# Patient Record
Sex: MALE | Race: WHITE | Employment: FULL TIME | URBAN - METROPOLITAN AREA
[De-identification: names, ages, dates, MRNs, and addresses within clinical notes are randomized per-mention and may not be internally consistent; named-entity substitution may affect disease eponyms.]

---

## 2017-04-04 RX ORDER — ISOSORBIDE MONONITRATE 30 MG/1
TABLET, EXTENDED RELEASE ORAL
Qty: 90 TAB | Refills: 2 | Status: SHIPPED | OUTPATIENT
Start: 2017-04-04 | End: 2017-12-18 | Stop reason: SDUPTHER

## 2017-04-14 ENCOUNTER — HOSPITAL ENCOUNTER (EMERGENCY)
Age: 77
Discharge: HOME | End: 2017-04-14
Attending: EMERGENCY MEDICINE | Admitting: EMERGENCY MEDICINE
Payer: COMMERCIAL

## 2017-04-14 ENCOUNTER — APPOINTMENT (OUTPATIENT)
Dept: CT IMAGING | Age: 77
End: 2017-04-14
Attending: EMERGENCY MEDICINE
Payer: COMMERCIAL

## 2017-04-14 VITALS
RESPIRATION RATE: 20 BRPM | DIASTOLIC BLOOD PRESSURE: 85 MMHG | OXYGEN SATURATION: 99 % | WEIGHT: 180 LBS | TEMPERATURE: 97.9 F | BODY MASS INDEX: 28.93 KG/M2 | SYSTOLIC BLOOD PRESSURE: 131 MMHG | HEART RATE: 66 BPM | HEIGHT: 66 IN

## 2017-04-14 DIAGNOSIS — S00.01XA SCALP ABRASION, INITIAL ENCOUNTER: ICD-10-CM
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
## 2017-04-14 PROCEDURE — 99284 EMERGENCY DEPT VISIT MOD MDM: CPT

## 2017-04-14 PROCEDURE — 77030036549 HC CLLR CERV EMT SEMI-RID PREFB S2SG -B

## 2017-04-14 PROCEDURE — 70450 CT HEAD/BRAIN W/O DYE: CPT

## 2017-04-14 PROCEDURE — 72125 CT NECK SPINE W/O DYE: CPT
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
## 2017-06-06 ENCOUNTER — OFFICE VISIT (OUTPATIENT)
Dept: CARDIOLOGY CLINIC | Age: 77
End: 2017-06-06

## 2017-06-06 VITALS
WEIGHT: 192 LBS | HEART RATE: 65 BPM | HEIGHT: 66 IN | SYSTOLIC BLOOD PRESSURE: 114 MMHG | BODY MASS INDEX: 30.86 KG/M2 | OXYGEN SATURATION: 97 % | DIASTOLIC BLOOD PRESSURE: 60 MMHG

## 2017-06-06 DIAGNOSIS — I15.9 SECONDARY HYPERTENSION: Primary | ICD-10-CM

## 2017-06-06 NOTE — PROGRESS NOTES
[facility]    Mr. Cristy Lang is a 78 y.o. male with history of coronary artery disease s/p LAD stent (06/2014), sleep apnea, hypertension, hyperlipidemia and obesity. Mr. Cristy Lang is here today for follow up appointment. He denies any hospital admission or ER visit since last six months. He denies any use of sublingual nitroglycerin. He denies any palpitations, presyncope or syncope. He denies any chest pain or chest tightness. He claims to take his medications regularly. He goes to chiropractor to help with his joint pains. Denies any nausea, vomiting, abdominal pain, fever, chills, sputum production. No hematuria or other bleeding complaints    Past Medical History:   Diagnosis Date    Asbestosis ([address]     CAD (coronary artery disease)     (06/2014) Mid LAD (3.25 X 23 mm ) Xience ROSA M    CPAP (continuous positive airway pressure) dependence     Hemorrhoids     Hypercholesteremia     Hypertension     Sleep apnea     Vitamin D deficiency        Past Surgical History:   Procedure Laterality Date    CHEST SURGERY PROCEDURE UNLISTED  1971    lobectomy left lung    HX APPENDECTOMY         Current Outpatient Prescriptions   Medication Sig    isosorbide mononitrate ER (IMDUR) 30 mg tablet TAKE 1 TABLET DAILY    carvedilol (COREG) 3.125 mg tablet TAKE 1 TABLET TWICE A DAY WITH MEALS    atorvastatin (LIPITOR) 20 mg tablet Take 1 Tab by mouth daily.  traMADol (ULTRAM) 50 mg tablet     lisinopril (PRINIVIL, ZESTRIL) 5 mg tablet Take 0.5 Tabs by mouth daily.  glimepiride (AMARYL) 2 mg tablet Take 4 mg by mouth every morning.  glucosamine-chondroitin (ARTHX) 500-400 mg cap Take 0.5 Caps by mouth daily.  nitroglycerin (NITROSTAT) 0.4 mg SL tablet 1 Tab by SubLINGual route as needed for Chest Pain.  Calcium Carbonate-Vit D3-Min (CALTRATE 600+D PLUS MINERALS) 600 mg calcium- 400 unit Tab Take  by mouth.       aspirin 81 mg tablet Take 162 mg by mouth daily.  ergocalciferol (ERGOCALCIFEROL) 50,000 unit capsule Take 50,000 Units by mouth every thirty (30) days.  omega-3 fatty acids-vitamin e (FISH OIL) 1,000 mg cap Take 1 Cap by mouth daily.  multivitamin (ONE A DAY) tablet Take 1 Tab by mouth daily. No current facility-administered medications for this visit. Allergies and Sensitivities:  No Known Allergies    Family History:  No family history on file. Social History:  Social History   Substance Use Topics    Smoking status: Former Smoker    Smokeless tobacco: Not on file    Alcohol use 1.0 oz/week     2 Glasses of wine per week      Comment: 16 oz daily     He  reports that he has quit smoking. He does not have any smokeless tobacco history on file. He  reports that he drinks about 1.0 oz of alcohol per week     Review of Systems:  Cardiac symptoms as noted above in HPI. All others negative. Denies fatigue, malaise, skin rash, joint pain, blurring vision, photophobia, neck pain, hemoptysis, chronic cough, nausea, vomiting, burning micturition, BRBPR, chronic headaches. Physical Exam:  BP Readings from Last 3 Encounters:   04/14/17 131/85   09/13/16 109/70   09/06/16 109/70         Pulse Readings from Last 3 Encounters:   04/14/17 66   09/13/16 66   09/06/16 71          Wt Readings from Last 3 Encounters:   04/14/17 180 lb (81.6 kg)   09/13/16 184 lb (83.5 kg)   09/06/16 184 lb (83.5 kg)       Constitutional: Oriented to person, place, and time. HENT: Head: Normocephalic and atraumatic. Neck: No JVD present. Cardiovascular: Regular rhythm. No murmur, gallop or rubs appreciated  Lung: Breath sounds normal. No respiratory distress. No ronchi or rales appreciated  Abdominal: No tenderness. No rebound and no guarding. Musculoskeletal: There is no lower extremity edema.      Review of Data  LABS:   Lab Results   Component Value Date/Time    Sodium 136 09/07/2014 11:15 AM    Potassium 4.0 09/07/2014 11:15 AM    Chloride 103 09/07/2014 11:15 AM    CO2 29 09/07/2014 11:15 AM    Glucose 218 09/07/2014 11:15 AM    BUN 12 09/07/2014 11:15 AM    Creatinine 1.01 09/07/2014 11:15 AM     Lipids Latest Ref Rng & Units 5/31/2014   Chol, Total <200 MG/   HDL 40 - 60 MG/DL 32(L)   LDL 0 - 100 MG/DL 40.2   Trig <150 MG/(H)   Chol/HDL Ratio 0 - 5.0   3.8     Lab Results   Component Value Date/Time    ALT (SGPT) 55 05/31/2014 02:31 AM     No results found for: HBA1C, [id number]    EKG  (07/2014) Sinus rhythm at 73 bpm. No ST-t changes of ischemia. Normal NM and QRS. Normal axis    ECHO (09/16)  SUMMARY:  Left ventricle: Systolic function was at the lower limits of normal.  Ejection fraction was estimated to be 55 %. There were no regional wall  motion abnormalities. Doppler parameters were consistent with abnormal  left ventricular relaxation (grade 1 diastolic dysfunction). Right ventricle: Systolic function was normal.  Mitral valve: There was no evidence for stenosis. There was trivial  regurgitation. Aortic valve: There was no stenosis. There was trivial regurgitation. Tricuspid valve: There was trivial regurgitation. STRESS TEST (06/2014)  1. Pharmacologic nuclear stress test using Lexiscan. 2. No EKG evidence of ischemia during Lexiscan infusion. 3. Medium to large area of reversible defect which is moderate in intensity and involving mid to distal anterior and anteroseptal wall involving apex, suggesting ischemia. No convincing evidence of infarct. 4. Gated images showed calculated ejection fraction of 69% without any obvious wall motion abnormality.     CATHETERIZATION (06/2014)  LVGram:(manual injection)   EF: 55% without significant wall motion abnormalities   Mitral Regurgitation: No significant   No significant gradient across the aortic valve   LVEDP ~12-14 mm hg   Coronary angiography:   Dominance: Right   LM: Angiographically normal   LAD: Mid tubular 95% stenosis otherwise normal, Large D1: prox 20% luminal irregularities otherwise normal   LCX: /OM: Angiographically normal   RCA: Dominant, 10-20% prox luminal irregularities otherwise Angiographically normal   PCI Detail for Mid LAD stenosis   Pre-dilation 2.5 X 15 mm TREK , 3.25 X 23 mm Xience ROSA M , Post-dilation 3.5 X 15 mm TREK NC baloon @ 14-16 génesis   Stenosis from 95% --> 0 %     STRESS TEST(09/16)  SUMMARY/IMPRESSION  1. Pharmacologic nuclear stress test using Lexiscan. 2. No EKG evidence of ischemia during Lexiscan infusion. 3. No convincing evidence of reversible defect to suggest ongoing major ischemia. 4. Diaphragmatic attenuation artifact noted over inferior border of the heart. Very small fixed apical defect, which is most likely from apical thinning versus artifact. 5. Calculated ejection fraction of 71% without any obvious wall motion abnormality. End-diastolic volume of 66 mL.     IMPRESSION & PLAN:  Brina Carney is a 78 y.o. male with coronary artery disease, hypertension, hyperlipidemia and obesity and sleep apnea. Coronary artery disease:  He had an LAD stent in 2014. Nuclear stress and echo in 09/16 without any RWMA and ischemia. Continue BB, imdur, ASA and statin  No S/L NTG since last visit. No angina currently. Continue same. Hypertension:  Bp is 114/60 mm Hg today. Continue same    Hyperlipidemia:  He is on low intensity atorvastatin without any side effect. PCP checks lipid profile and manages this. I don't have last profile on record. Per patient, it was within acceptable range at PCP. Obesity and sleep apnea: Importance of diet and exercise was discussed with the patient again. He admits to binge eating sometimes. Advised to follow cardiac diet again this visit. This plan was discussed with patient who is in agreement. Thank you for allowing me to participate in patient care. Please feel free to call me if you have any question or concern.

## 2017-06-06 NOTE — PROGRESS NOTES
1. Have you been to the ER, urgent care clinic since your last visit? Hospitalized since your last visit? No    2. Have you seen or consulted any other health care providers outside of the [address] [place] [address] since your last visit? Include any pap smears or colon screening.  No

## 2017-06-06 NOTE — MR AVS SNAPSHOT
Visit Information Date & Time Provider Department Dept. Phone Encounter #  
 6/6/2017  1:30 PM Nicholas Tanner MD [address] [place] [address] Specialist at [facility] [phone] [id number] Follow-up Instructions Return in about 9 months (around 3/6/2018). Upcoming Health Maintenance Date Due DTaP/Tdap/Td series (1 - Tdap) 10/17/1963 FOBT Q 1 YEAR AGE 50-75 10/17/1992 ZOSTER VACCINE AGE 60> 10/17/2002 GLAUCOMA SCREENING Q2Y 10/17/2007 Pneumococcal 65+ Low/Medium Risk (1 of 2 - PCV13) 10/17/2007 MEDICARE YEARLY EXAM 10/17/2007 INFLUENZA AGE 9 TO ADULT 8/1/2017 Allergies as of 6/6/2017  Review Complete On: 6/6/2017 By: Rody Atkins LPN No Known Allergies Current Immunizations  Never Reviewed No immunizations on file. Not reviewed this visit You Were Diagnosed With   
  
 Codes Comments Secondary hypertension    -  Primary ICD-10-CM: I15.9 ICD-9-CM: 405.99 Vitals BP Pulse Height(growth percentile) Weight(growth percentile) SpO2 BMI  
 114/60 65 5' 6\" (1.676 m) 192 lb (87.1 kg) 97% 30.99 kg/m2 Smoking Status Former Smoker BMI and BSA Data Body Mass Index Body Surface Area 30.99 kg/m 2 2.01 m 2 Preferred Pharmacy Pharmacy Name Phone [address] [facility] [phone] Your Updated Medication List  
  
   
This list is accurate as of: 6/6/17  1:42 PM.  Always use your most recent med list.  
  
  
  
  
 aspirin 81 mg tablet Take 162 mg by mouth daily. atorvastatin 20 mg tablet Commonly known as:  LIPITOR Take 1 Tab by mouth daily. carvedilol 3.125 mg tablet Commonly known as:  COREG  
TAKE 1 TABLET TWICE A DAY WITH MEALS  
  
 ergocalciferol 50,000 unit capsule Commonly known as:  ERGOCALCIFEROL Take 50,000 Units by mouth every thirty (30) days. FISH OIL 1,000 mg Cap Generic drug:  omega-3 fatty acids-vitamin e Take 1 Cap by mouth daily. glimepiride 2 mg tablet Commonly known as:  AMARYL Take 4 mg by mouth every morning. glucosamine-chondroitin 500-400 mg Cap Commonly known as:  [facility] Take 0.5 Caps by mouth daily. isosorbide mononitrate ER 30 mg tablet Commonly known as:  IMDUR  
TAKE 1 TABLET DAILY  
  
 lisinopril 5 mg tablet Commonly known as:  Latesha Gear Take 0.5 Tabs by mouth daily. multivitamin tablet Commonly known as:  ONE A DAY Take 1 Tab by mouth daily. nitroglycerin 0.4 mg SL tablet Commonly known as:  NITROSTAT  
1 Tab by SubLINGual route as needed for Chest Pain. We Performed the Following AMB POC EKG ROUTINE W/ 12 LEADS, INTER & REP [48981 CPT(R)] Follow-up Instructions Return in about 9 months (around 3/6/2018). Introducing [facility]! [facility] introduces [employer] patient portal. Now you can access parts of your medical record, email your doctor's office, and request medication refills online. 1. In your internet browser, go to [email or web address] 2. Click on the First Time User? Click Here link in the Sign In box. You will see the New Member Sign Up page. 3. Enter your [employer] Access Code exactly as it appears below. You will not need to use this code after youve completed the sign-up process. If you do not sign up before the expiration date, you must request a new code. · [employer] Access Code: [email or web address] Expires: 7/13/2017 10:18 AM 
 
4. Enter the last four digits of your Social Security Number (xxxx) and Date of Birth (mm/dd/yyyy) as indicated and click Submit. You will be taken to the next sign-up page. 5. Create a [employer] ID. This will be your [employer] login ID and cannot be changed, so think of one that is secure and easy to remember. 6. Create a [employer] password. You can change your password at any time. 7. Enter your Password Reset Question and Answer. This can be used at a later time if you forget your password. 8. Enter your e-mail address. You will receive e-mail notification when new information is available in [address] 9. Click Sign Up. You can now view and download portions of your medical record. 10. Click the Download Summary menu link to download a portable copy of your medical information. If you have questions, please visit the Frequently Asked Questions section of the [employer] website. Remember, [employer] is NOT to be used for urgent needs. For medical emergencies, dial 911. Now available from your iPhone and Android! Please provide this summary of care documentation to your next provider. Your primary care clinician is listed as Kamryn Pfeiffer. If you have any questions after today's visit, please call [phone].

## 2017-06-06 NOTE — LETTER
Patient:  Airam Levy YOB: 1940 Date of Visit: 6/6/2017 Dear Celine Thrasher MD 
[address] [facility] [address] VIA Facsimile: [phone] 
 : 
 
 
 
                                                           [facility] Mr. Kemal Ruiz is a 78 y.o. male with history of coronary artery disease s/p LAD stent (06/2014), sleep apnea, hypertension, hyperlipidemia and obesity. Mr. Kemal Ruiz is here today for follow up appointment. He denies any hospital admission or ER visit since last six months. He denies any use of sublingual nitroglycerin. He denies any palpitations, presyncope or syncope. He denies any chest pain or chest tightness. He claims to take his medications regularly. He goes to chiropractor to help with his joint pains. Denies any nausea, vomiting, abdominal pain, fever, chills, sputum production. No hematuria or other bleeding complaints Past Medical History:  
Diagnosis Date  Asbestosis ([address]  CAD (coronary artery disease)   
 (06/2014) Mid LAD (3.25 X 23 mm ) Xience ROSA M  CPAP (continuous positive airway pressure) dependence  Hemorrhoids  Hypercholesteremia  Hypertension  Sleep apnea  Vitamin D deficiency Past Surgical History:  
Procedure Laterality Date  CHEST SURGERY PROCEDURE UNLISTED  1971  
 lobectomy left lung  HX APPENDECTOMY Current Outpatient Prescriptions Medication Sig  
 isosorbide mononitrate ER (IMDUR) 30 mg tablet TAKE 1 TABLET DAILY  carvedilol (COREG) 3.125 mg tablet TAKE 1 TABLET TWICE A DAY WITH MEALS  
 atorvastatin (LIPITOR) 20 mg tablet Take 1 Tab by mouth daily.  traMADol (ULTRAM) 50 mg tablet  lisinopril (PRINIVIL, ZESTRIL) 5 mg tablet Take 0.5 Tabs by mouth daily.  glimepiride (AMARYL) 2 mg tablet Take 4 mg by mouth every morning.  glucosamine-chondroitin (ARTHX) 500-400 mg cap Take 0.5 Caps by mouth daily.  nitroglycerin (NITROSTAT) 0.4 mg SL tablet 1 Tab by SubLINGual route as needed for Chest Pain.  Calcium Carbonate-Vit D3-Min (CALTRATE 600+D PLUS MINERALS) 600 mg calcium- 400 unit Tab Take  by mouth.  aspirin 81 mg tablet Take 162 mg by mouth daily.  ergocalciferol (ERGOCALCIFEROL) 50,000 unit capsule Take 50,000 Units by mouth every thirty (30) days.  omega-3 fatty acids-vitamin e (FISH OIL) 1,000 mg cap Take 1 Cap by mouth daily.  multivitamin (ONE A DAY) tablet Take 1 Tab by mouth daily. No current facility-administered medications for this visit. Allergies and Sensitivities: 
No Known Allergies Family History: No family history on file. Social History: 
Social History Substance Use Topics  Smoking status: Former Smoker  Smokeless tobacco: Not on file  Alcohol use 1.0 oz/week 2 Glasses of wine per week Comment: 16 oz daily He  reports that he has quit smoking. He does not have any smokeless tobacco history on file. He  reports that he drinks about 1.0 oz of alcohol per week Review of Systems: 
Cardiac symptoms as noted above in HPI. All others negative. Denies fatigue, malaise, skin rash, joint pain, blurring vision, photophobia, neck pain, hemoptysis, chronic cough, nausea, vomiting, burning micturition, BRBPR, chronic headaches. Physical Exam: 
BP Readings from Last 3 Encounters:  
04/14/17 131/85  
09/13/16 109/70  
09/06/16 109/70 Pulse Readings from Last 3 Encounters:  
04/14/17 66  
09/13/16 66  
09/06/16 71 Wt Readings from Last 3 Encounters:  
04/14/17 180 lb (81.6 kg) 09/13/16 184 lb (83.5 kg) 09/06/16 184 lb (83.5 kg) Constitutional: Oriented to person, place, and time. HENT: Head: Normocephalic and atraumatic. Neck: No JVD present. Cardiovascular: Regular rhythm. No murmur, gallop or rubs appreciated Lung: Breath sounds normal. No respiratory distress. No ronchi or rales appreciated Abdominal: No tenderness. No rebound and no guarding. Musculoskeletal: There is no lower extremity edema. Review of Data LABS:  
Lab Results Component Value Date/Time Sodium 136 09/07/2014 11:15 AM  
 Potassium 4.0 09/07/2014 11:15 AM  
 Chloride 103 09/07/2014 11:15 AM  
 CO2 29 09/07/2014 11:15 AM  
 Glucose 218 09/07/2014 11:15 AM  
 BUN 12 09/07/2014 11:15 AM  
 Creatinine 1.01 09/07/2014 11:15 AM  
 
Lipids Latest Ref Rng & Units 5/31/2014 Chol, Total <200 MG/ HDL 40 - 60 MG/DL 32(L) LDL 0 - 100 MG/DL 40.2 Trig <150 MG/(H) Chol/HDL Ratio 0 - 5.0   3.8 Lab Results Component Value Date/Time ALT (SGPT) 55 05/31/2014 02:31 AM  
 
No results found for: HBA1C, [id number] 
 
EKG 
(07/2014) Sinus rhythm at 73 bpm. No ST-t changes of ischemia. Normal NV and QRS. Normal axis ECHO (09/16) SUMMARY: 
Left ventricle: Systolic function was at the lower limits of normal. 
Ejection fraction was estimated to be 55 %. There were no regional wall 
motion abnormalities. Doppler parameters were consistent with abnormal 
left ventricular relaxation (grade 1 diastolic dysfunction). Right ventricle: Systolic function was normal. 
Mitral valve: There was no evidence for stenosis. There was trivial 
regurgitation. Aortic valve: There was no stenosis. There was trivial regurgitation. Tricuspid valve: There was trivial regurgitation. STRESS TEST (06/2014) 1. Pharmacologic nuclear stress test using Lexiscan. 2. No EKG evidence of ischemia during Lexiscan infusion. 3. Medium to large area of reversible defect which is moderate in intensity and involving mid to distal anterior and anteroseptal wall involving apex, suggesting ischemia. No convincing evidence of infarct. 4. Gated images showed calculated ejection fraction of 69% without any obvious wall motion abnormality. CATHETERIZATION (06/2014) LVGram:(manual injection) EF: 55% without significant wall motion abnormalities Mitral Regurgitation: No significant No significant gradient across the aortic valve LVEDP ~12-14 mm hg  
Coronary angiography:  
Dominance: Right LM: Angiographically normal  
LAD: Mid tubular 95% stenosis otherwise normal, Large D1: prox 20% luminal irregularities otherwise normal  
LCX: /OM: Angiographically normal  
RCA: Dominant, 10-20% prox luminal irregularities otherwise Angiographically normal  
PCI Detail for Mid LAD stenosis Pre-dilation 2.5 X 15 mm TREK , 3.25 X 23 mm Xience ROSA M , Post-dilation 3.5 X 15 mm TREK NC baloon @ 14-16 génesis Stenosis from 95% --> 0 % STRESS TEST(09/16) SUMMARY/IMPRESSION 1. Pharmacologic nuclear stress test using Lexiscan. 2. No EKG evidence of ischemia during Lexiscan infusion. 3. No convincing evidence of reversible defect to suggest ongoing major ischemia. 4. Diaphragmatic attenuation artifact noted over inferior border of the heart. Very small fixed apical defect, which is most likely from apical thinning versus artifact. 5. Calculated ejection fraction of 71% without any obvious wall motion abnormality. End-diastolic volume of 66 mL. 
  
IMPRESSION & PLAN: 
Lorrie Mcfadden is a 78 y.o. male with coronary artery disease, hypertension, hyperlipidemia and obesity and sleep apnea. Coronary artery disease:  He had an LAD stent in 2014. Nuclear stress and echo in 09/16 without any RWMA and ischemia. Continue BB, imdur, ASA and statin No S/L NTG since last visit. No angina currently. Continue same. Hypertension:  Bp is 114/60 mm Hg today. Continue same Hyperlipidemia:  He is on low intensity atorvastatin without any side effect. PCP checks lipid profile and manages this. I don't have last profile on record. Per patient, it was within acceptable range at PCP. Obesity and sleep apnea: Importance of diet and exercise was discussed with the patient again. He admits to binge eating sometimes. Advised to follow cardiac diet again this visit. This plan was discussed with patient who is in agreement. Thank you for allowing me to participate in patient care. Please feel free to call me if you have any question or concern. Sincerely, Holly Soto MD

## 2017-06-21 NOTE — COMMUNICATION BODY
[facility]    Mr. Kemal Ruiz is a 78 y.o. male with history of coronary artery disease s/p LAD stent (06/2014), sleep apnea, hypertension, hyperlipidemia and obesity. Mr. Kemal Ruiz is here today for follow up appointment. He denies any hospital admission or ER visit since last six months. He denies any use of sublingual nitroglycerin. He denies any palpitations, presyncope or syncope. He denies any chest pain or chest tightness. He claims to take his medications regularly. He goes to chiropractor to help with his joint pains. Denies any nausea, vomiting, abdominal pain, fever, chills, sputum production. No hematuria or other bleeding complaints    Past Medical History:   Diagnosis Date    Asbestosis ([address]     CAD (coronary artery disease)     (06/2014) Mid LAD (3.25 X 23 mm ) Xience ROSA M    CPAP (continuous positive airway pressure) dependence     Hemorrhoids     Hypercholesteremia     Hypertension     Sleep apnea     Vitamin D deficiency        Past Surgical History:   Procedure Laterality Date    CHEST SURGERY PROCEDURE UNLISTED  1971    lobectomy left lung    HX APPENDECTOMY         Current Outpatient Prescriptions   Medication Sig    isosorbide mononitrate ER (IMDUR) 30 mg tablet TAKE 1 TABLET DAILY    carvedilol (COREG) 3.125 mg tablet TAKE 1 TABLET TWICE A DAY WITH MEALS    atorvastatin (LIPITOR) 20 mg tablet Take 1 Tab by mouth daily.  traMADol (ULTRAM) 50 mg tablet     lisinopril (PRINIVIL, ZESTRIL) 5 mg tablet Take 0.5 Tabs by mouth daily.  glimepiride (AMARYL) 2 mg tablet Take 4 mg by mouth every morning.  glucosamine-chondroitin (ARTHX) 500-400 mg cap Take 0.5 Caps by mouth daily.  nitroglycerin (NITROSTAT) 0.4 mg SL tablet 1 Tab by SubLINGual route as needed for Chest Pain.  Calcium Carbonate-Vit D3-Min (CALTRATE 600+D PLUS MINERALS) 600 mg calcium- 400 unit Tab Take  by mouth.       aspirin 81 mg tablet Take 162 mg by mouth daily.  ergocalciferol (ERGOCALCIFEROL) 50,000 unit capsule Take 50,000 Units by mouth every thirty (30) days.  omega-3 fatty acids-vitamin e (FISH OIL) 1,000 mg cap Take 1 Cap by mouth daily.  multivitamin (ONE A DAY) tablet Take 1 Tab by mouth daily. No current facility-administered medications for this visit. Allergies and Sensitivities:  No Known Allergies    Family History:  No family history on file. Social History:  Social History   Substance Use Topics    Smoking status: Former Smoker    Smokeless tobacco: Not on file    Alcohol use 1.0 oz/week     2 Glasses of wine per week      Comment: 16 oz daily     He  reports that he has quit smoking. He does not have any smokeless tobacco history on file. He  reports that he drinks about 1.0 oz of alcohol per week     Review of Systems:  Cardiac symptoms as noted above in HPI. All others negative. Denies fatigue, malaise, skin rash, joint pain, blurring vision, photophobia, neck pain, hemoptysis, chronic cough, nausea, vomiting, burning micturition, BRBPR, chronic headaches. Physical Exam:  BP Readings from Last 3 Encounters:   04/14/17 131/85   09/13/16 109/70   09/06/16 109/70         Pulse Readings from Last 3 Encounters:   04/14/17 66   09/13/16 66   09/06/16 71          Wt Readings from Last 3 Encounters:   04/14/17 180 lb (81.6 kg)   09/13/16 184 lb (83.5 kg)   09/06/16 184 lb (83.5 kg)       Constitutional: Oriented to person, place, and time. HENT: Head: Normocephalic and atraumatic. Neck: No JVD present. Cardiovascular: Regular rhythm. No murmur, gallop or rubs appreciated  Lung: Breath sounds normal. No respiratory distress. No ronchi or rales appreciated  Abdominal: No tenderness. No rebound and no guarding. Musculoskeletal: There is no lower extremity edema.      Review of Data  LABS:   Lab Results   Component Value Date/Time    Sodium 136 09/07/2014 11:15 AM    Potassium 4.0 09/07/2014 11:15 AM    Chloride 103 09/07/2014 11:15 AM    CO2 29 09/07/2014 11:15 AM    Glucose 218 09/07/2014 11:15 AM    BUN 12 09/07/2014 11:15 AM    Creatinine 1.01 09/07/2014 11:15 AM     Lipids Latest Ref Rng & Units 5/31/2014   Chol, Total <200 MG/   HDL 40 - 60 MG/DL 32(L)   LDL 0 - 100 MG/DL 40.2   Trig <150 MG/(H)   Chol/HDL Ratio 0 - 5.0   3.8     Lab Results   Component Value Date/Time    ALT (SGPT) 55 05/31/2014 02:31 AM     No results found for: HBA1C, [id number]    EKG  (07/2014) Sinus rhythm at 73 bpm. No ST-t changes of ischemia. Normal [place] and QRS. Normal axis    ECHO (09/16)  SUMMARY:  Left ventricle: Systolic function was at the lower limits of normal.  Ejection fraction was estimated to be 55 %. There were no regional wall  motion abnormalities. Doppler parameters were consistent with abnormal  left ventricular relaxation (grade 1 diastolic dysfunction). Right ventricle: Systolic function was normal.  Mitral valve: There was no evidence for stenosis. There was trivial  regurgitation. Aortic valve: There was no stenosis. There was trivial regurgitation. Tricuspid valve: There was trivial regurgitation. STRESS TEST (06/2014)  1. Pharmacologic nuclear stress test using Lexiscan. 2. No EKG evidence of ischemia during Lexiscan infusion. 3. Medium to large area of reversible defect which is moderate in intensity and involving mid to distal anterior and anteroseptal wall involving apex, suggesting ischemia. No convincing evidence of infarct. 4. Gated images showed calculated ejection fraction of 69% without any obvious wall motion abnormality.     CATHETERIZATION (06/2014)  LVGram:(manual injection)   EF: 55% without significant wall motion abnormalities   Mitral Regurgitation: No significant   No significant gradient across the aortic valve   LVEDP ~12-14 mm hg   Coronary angiography:   Dominance: Right   LM: Angiographically normal   LAD: Mid tubular 95% stenosis otherwise normal, Large D1: prox 20% luminal irregularities otherwise normal   LCX: /OM: Angiographically normal   RCA: Dominant, 10-20% prox luminal irregularities otherwise Angiographically normal   PCI Detail for Mid LAD stenosis   Pre-dilation 2.5 X 15 mm TREK , 3.25 X 23 mm Xience ROSA M , Post-dilation 3.5 X 15 mm TREK NC baloon @ 14-16 génesis   Stenosis from 95% --> 0 %     STRESS TEST(09/16)  SUMMARY/IMPRESSION  1. Pharmacologic nuclear stress test using Lexiscan. 2. No EKG evidence of ischemia during Lexiscan infusion. 3. No convincing evidence of reversible defect to suggest ongoing major ischemia. 4. Diaphragmatic attenuation artifact noted over inferior border of the heart. Very small fixed apical defect, which is most likely from apical thinning versus artifact. 5. Calculated ejection fraction of 71% without any obvious wall motion abnormality. End-diastolic volume of 66 mL.     IMPRESSION & PLAN:  Saira Pack is a 78 y.o. male with coronary artery disease, hypertension, hyperlipidemia and obesity and sleep apnea. Coronary artery disease:  He had an LAD stent in 2014. Nuclear stress and echo in 09/16 without any RWMA and ischemia. Continue BB, imdur, ASA and statin  No S/L NTG since last visit. No angina currently. Continue same. Hypertension:  Bp is 114/60 mm Hg today. Continue same    Hyperlipidemia:  He is on low intensity atorvastatin without any side effect. PCP checks lipid profile and manages this. I don't have last profile on record. Per patient, it was within acceptable range at PCP. Obesity and sleep apnea: Importance of diet and exercise was discussed with the patient again. He admits to binge eating sometimes. Advised to follow cardiac diet again this visit. This plan was discussed with patient who is in agreement. Thank you for allowing me to participate in patient care. Please feel free to call me if you have any question or concern.

## 2017-07-10 RX ORDER — CARVEDILOL 3.12 MG/1
TABLET ORAL
Qty: 180 TAB | Refills: 1 | Status: SHIPPED | OUTPATIENT
Start: 2017-07-10 | End: 2018-01-06 | Stop reason: SDUPTHER

## 2017-08-02 ENCOUNTER — TELEPHONE (OUTPATIENT)
Dept: CARDIOLOGY CLINIC | Age: 77
End: 2017-08-02

## 2017-08-02 NOTE — TELEPHONE ENCOUNTER
Pricilla Cobian with Dr Blanchard Matter office called and states that patient will need surgical clearance for 9/8/17 total hip replacement.   Patient saw Dr Clifford Macias early June, so will send message to him to inquire about clearing from that visit or having patient return to the office for follow up first.

## 2017-08-02 NOTE — LETTER
8/8/2017 9:45 AM 
 
Mr. Capo Ferrer [address] [id number] Cap[place] Ferrer was seen in our office on 6/6/17 for cardiac evaluation. From a cardiac standpoint he is cleared for a total hip replacement with Dr Zac Salazar at an intermediate risk, but should remain on his aspirin 81mg daily. Please feel free to contact our office if you have any questions regarding this patient. Sincerely, Garcia Amos MD

## 2017-08-08 NOTE — TELEPHONE ENCOUNTER
I spoke with Dr Mazin Garcia in the office today. Per Dr Mazin Garcia, patient is cleared for a total hip replacement with Dr Precious Rapp on 9/8/17 at an intermediate risk. Tried to call Trevon Gonzalez at his office back to make her aware and get fax # for clearance letter, but received voicemail so I left a message.         Verbal order and read back per Rand Aguilar MD

## 2017-09-03 RX ORDER — ATORVASTATIN CALCIUM 20 MG/1
TABLET, FILM COATED ORAL
Qty: 90 TAB | Refills: 3 | Status: SHIPPED | OUTPATIENT
Start: 2017-09-03 | End: 2018-09-11 | Stop reason: SDUPTHER

## 2017-10-06 ENCOUNTER — HOSPITAL ENCOUNTER (OUTPATIENT)
Dept: PHYSICAL THERAPY | Age: 77
Discharge: HOME | End: 2017-10-06
Payer: MEDICARE

## 2017-10-06 PROCEDURE — G8979 MOBILITY GOAL STATUS: HCPCS

## 2017-10-06 PROCEDURE — 97162 PT EVAL MOD COMPLEX 30 MIN: CPT

## 2017-10-06 PROCEDURE — 97110 THERAPEUTIC EXERCISES: CPT

## 2017-10-06 PROCEDURE — G8978 MOBILITY CURRENT STATUS: HCPCS

## 2017-10-06 PROCEDURE — 97140 MANUAL THERAPY 1/> REGIONS: CPT

## 2017-10-06 NOTE — PROGRESS NOTES
PHYSICAL THERAPY - DAILY TREATMENT NOTE    Patient Name: Stephanie Forbes        Date: 10/6/2017  : 1940   YES Patient  Verified  Visit #:   1     Insurance: Payor: Liam Medina / Plan: [place] / Product Type: Medicare /      In time: 10:25 Out time: 11:20   Total Treatment Time: 55 min     Medicare Time Tracking (below)   Total Timed Codes (min):  25 1:1 Treatment Time:  25     TREATMENT AREA =  S/P L YUNG    SUBJECTIVE    Pain Level (on 0 to 10 scale):  2  / 10   Medication Changes/New allergies or changes in medical history, any new surgeries or procedures? NO    If yes, update Summary List   Subjective Functional Status/Changes:  []  No changes reported     Patient reports S/P L YUNG 2017 postero-lateral approach. Pt reports he was in the hospital x 3 days. Then he had HHPT 3 days a week for 3 weeks. Pt reports the pain has gotten better since surgery. Patient reports most of his pain is in between his knee and his thigh along the lateral side. Pt reports very minimal pain with standing/walking and increased pain when sitting because of the pressure on the postero-lateral side of leg. Pt reports ambulating in his house without [facility] and ambulating with [facility] community distances. States he walks 2.5 miles in the morning and 2.5 miles in the evening. OBJECTIVE    Physical Therapy Evaluation- Hip    Gait:  [] Normal    [] Abnormal    [x] Antalgic    [] NWB    Device: [facility])    Describe:         ROM/Strength        AROM                     PROM        Strength (1-5)  Hip Left Right Left Right Left Right   Flexion 79 110 95 p!  NT 4 4+   Extension     3+ 4   Abduction     2+ 4   ER 10 p! 38 NT NT 3+ 4+   IR 26 p! 35 NT NT 3+ 4+   Knee Left Right Left Right Left Right   Extension     4+ 4+   Flexion     4+ 5        Flexibility: [] Unable to assess at this time  Hamstrings:    (L) Tightness= [] WNL   [x] Min   [] Mod   [] Severe    (R) Tightness= [] WNL   [x] Min   [] Mod [] Severe  Quadriceps:    (L) Tightness= [] WNL   [x] Min   [] Mod   [] Severe    (R) Tightness= [] WNL   [x] Min   [] Mod   [] Severe  Gastroc:    (L) Tightness= [] WNL   [x] Min   [] Mod   [] Severe    (R) Tightness= [] WNL   [x] Min   [] Mod   [] Severe                                  Palpation  [] Min  [x] Mod  [] Severe    Location: TTP lateral thigh, ITB/HS/VL complex  [x] Min  [] Mod  [] Severe    Location: Micaela-incisional tenderness    15 (15) min Therapeutic Exercise:  [x]  See flow sheet   Rationale:      increase ROM, increase strength, improve coordination, improve balance and increase proprioception to improve the patients ability to perform ADL's and functional mobility with increased ease and efficiency of movement      10 (10) min Manual Therapy: IASTM with medium cup to L lateral thigh, STM to ITB/HS/VL complex   Rationale:      decrease pain, increase ROM, increase tissue extensibility, decrease trigger points and increase postural awareness to improve patient's ability to perform ADL's and functional mobility with increased ease and efficiency of movement     throughout min Patient Education:  YES  Reviewed HEP   []  Progressed/Changed HEP based on:   Provided HEP handout     Other Objective/Functional Measures:    See above     Post Treatment Pain Level (on 0 to 10) scale:   0  / 10     ASSESSMENT    Assessment/Changes in Function:     Patient History: High (Coronary Artery disease, diverticulitis, vitamin D deficiency, Hypercholesteremia, HTN, Sleep apnea, Hx L lung lobectomy, Hx tobacco use)  Examination (low 1-2, mod 3+, high 4+): Moderate per objective above  Clinical Presentation (low stable or uncomplicated, mod evolving or changing, high unstable or unpredictable):  Moderate  Clinical Decision Making (low , mod 26-74, high 1-25): FOTO Moderate     [x]  See Progress Note/Recertification   Patient will continue to benefit from skilled PT services to modify and progress therapeutic interventions, address functional mobility deficits, address ROM deficits, address strength deficits, analyze and address soft tissue restrictions, analyze and cue movement patterns, analyze and modify body mechanics/ergonomics, assess and modify postural abnormalities, address imbalance/dizziness and instruct in home and community integration to attain remaining goals. to attain remaining goals.    Progress toward goals / Updated goals:    See plan of care     PLAN    [x]  Upgrade activities as tolerated YES Continue plan of care   []  Discharge due to :    []  Other:      Therapist: Salomón Zheng DPT    Date: 10/6/2017 Time: 12:03 PM

## 2017-10-06 NOTE — PROGRESS NOTES
[address] PHYSICAL THERAPY  [address] [place] [address], [address] - Phone: [phone]  Fax: [phone] / 4[facility]  Patient Name: Verner Gallery : 1940   Medical   Diagnosis: Left hip pain [M25.552] Treatment Diagnosis: S/P L YUNG   Onset Date: 2017 AGE: 78 y.o. Referral Source: Joyce Bentley MD Start of [facility]): 10/6/2017   Prior Hospitalization: See medical history Provider #: [id number]   Prior Level of Function: Hx L hip OA, ambulating mod(I) SPC   Comorbidities: Coronary Artery disease, diverticulitis, vitamin D deficiency, Hypercholesteremia, HTN, Sleep apnea, Hx L lung lobectomy, Hx tobacco use   Medications: Verified on Patient Summary List   The Plan of Care and following information is based on the information from the initial evaluation.   =======================================================================================  Assessment / key information:  Patient is a 78 y.o. male who presents with chief complaint of L hip pain. Patient is s/p L YUNG posterior approach 2017. Patient presents to PT evaluation ambulating with mild antalgic gait with SPC. Pt demo decreased L hip A/P ROM, dec LE strength/flexibility, and overall impaired functional mobility. Reviewed with patient posterior hip precautions and patient able to recite all without cueing. Pt negotiated 3 steps x 2 trials with SPC to ensure safety with home entry/exit. Patient would benefit from skilled PT services to address these issues and improve independent ambulation, LE strength and ease with ADLs and self care duties. Thank you for this referral       Objective Data:  LE ROM/Strength        AROM                           PROM                             Strength (1-5)  Hip Left Right Left Right Left Right   Flexion 79 110 95 p!  NT 4 4+   Extension         3+ 4   Abduction         2+ 4   ER 10 p! 38 NT NT 3+ 4+   IR 26 p! 35 NT NT 3+ 4+   Knee Left Right Left Right Left Right   Extension         4+ 4+   Flexion         4+ 5       ======================================================================================  Eval Complexity: History: HIGH Complexity :3+ comorbidities / personal factors will impact the outcome/ POC Exam:MEDIUM Complexity : 3 Standardized tests and measures addressing body structure, function, activity limitation and / or participation in recreation  Presentation: MEDIUM Complexity : Evolving with changing characteristics  Clinical Decision Making:MEDIUM Complexity : FOTO score of 26-74Overall Complexity:MEDIUM  Problem List: pain affecting function, decrease ROM, decrease strength, edema affecting function, impaired gait/ balance, decrease ADL/ functional abilitiies, decrease activity tolerance, decrease flexibility/ joint mobility and decrease transfer abilities   Treatment Plan may include any combination of the following: Therapeutic exercise, Therapeutic activities, Neuromuscular re-education, Physical agent/modality, Gait/balance training, Manual therapy, Aquatic therapy, Patient education, Self Care training, Functional mobility training and Home safety training  Patient / Family readiness to learn indicated by: asking questions, trying to perform skills and interest  Persons(s) to be included in education: patient (P)  Barriers to Learning/Limitations: None  Measures taken:    Patient Goal (s): Improve ease with stairs and walk without AD   Patient self reported health status: good  Rehabilitation Potential: good   Short Term Goals: To be accomplished in  2-3  Weeks:  1. Patient will be compliant with HEP in order to facilitate progression of therapeutic exercise and improve mobility  2. Patient will improve L SLS time to >/= 10 seconds in order to progress independent ambulation  3.  Patient will demonstrate >/= 4-/5 hip abduction strength to improve ease with ADLs     Long Term Goals: To be accomplished in  4-6  Weeks:  1. Patient will be independent with HEP in order to demonstrate ability to perform therapeutic exercises and continue progressing functional mobility upon D/C  2. Patient will negotiate 10 steps independently with reciprocal pattern to improve ease with community ambulation  3. Patient will ambulate > 15 minutes safely without AD in order to improve independent ambulation  4. Patient will improve FOTO score to 50/100 to demonstrate a meaningful improvement in functional mobility and increased quality of life      Frequency / Duration:   Patient to be seen  2  times per week for 4-6  weeks:  Patient / Caregiver education and instruction: self care, activity modification and exercises  G-Codes (GP): Mobility [id number] Current  CL= 60-79%   Goal  CK= 40-59%. The severity rating is based on the FOTO Score  Therapist Signature: Terese Mackey DPT Date: 52/2/7686   Certification Period: 10/6/17 - 1/5/17 Time: 12:02 PM   ===========================================================================================  I certify that the above Physical Therapy Services are being furnished while the patient is under my care. I agree with the treatment plan and certify that this therapy is necessary. Physician Signature:        Date:       Time:     Please sign and return to [employer] or you may fax the signed copy to [phone]. Thank you.

## 2017-10-10 ENCOUNTER — HOSPITAL ENCOUNTER (OUTPATIENT)
Dept: PHYSICAL THERAPY | Age: 77
Discharge: HOME | End: 2017-10-10
Payer: MEDICARE

## 2017-10-10 PROCEDURE — 97530 THERAPEUTIC ACTIVITIES: CPT

## 2017-10-10 PROCEDURE — 97110 THERAPEUTIC EXERCISES: CPT

## 2017-10-10 NOTE — PROGRESS NOTES
PHYSICAL THERAPY - DAILY TREATMENT NOTE    Patient Name: José Miguel Dodson        Date: 10/10/2017  : 1940   YES Patient  Verified  Visit #:   2     Insurance: Payor: Eusebio Bowers / Plan: [place] MED[place] PART [place] & B / Product Type: Medicare /      In time: 1:05 Out time: 2:00   Total Treatment Time: 54     Medicare Time Tracking (below)   Total Timed Codes (min):  55 1:1 Treatment Time:  40     TREATMENT AREA =  Left hip pain [M25.552]    SUBJECTIVE    Pain Level (on 0 to 10 scale):  2  / 10   Medication Changes/New allergies or changes in medical history, any new surgeries or procedures? NO     If yes, update Summary List   Subjective Functional Status/Changes:  []  No changes reported     \"I have a lot of pain when I put weight on this leg, especially going up and down stairs. Its right down the side. \"          OBJECTIVE    15 min Therapeutic Exercise:  [x]  See flow sheet   Rationale:      increase ROM and increase strength to improve the patients ability to perform ADL's with improved hip girdle stability. 32 min Therapeutic Activity: Tap-up's, marches, step-up's, relative SLS with hip 3way   Rationale: To improve safety and efficiency with ADL's.    8 min Manual Therapy: IASTM to the ITB with medium cup and PVC pipe   Rationale:      decrease pain, increase ROM and increase tissue extensibility to improve patient's ability to perform WB ADL's with improved activity tolerance. min Patient Education:  YES  Reviewed HEP   []  Progressed/Changed HEP based on:   Patient reports compliance     Other Objective/Functional Measures:    Pt symptoms decreased after manual interventions. The pt demonstrated minimal to no difficulty with tap-up's. Pt was appropriately challenged with relative SLS in hip 3-way and requires vc's to perform without compensation.      Post Treatment Pain Level (on 0 to 10) scale:   2  / 10     ASSESSMENT    Assessment/Changes in Function:     Pt presents to PT progressing per activity tolerance. []  See Progress Note/Recertification   Patient will continue to benefit from skilled PT services to modify and progress therapeutic interventions, address functional mobility deficits, address ROM deficits, address strength deficits, analyze and address soft tissue restrictions, analyze and cue movement patterns and analyze and modify body mechanics/ergonomics to attain remaining goals. Progress toward goals / Updated goals: Addressed STG 2 with hip 3-way and SLS.      PLAN    [x]  Upgrade activities as tolerated YES Continue plan of care   []  Discharge due to :    []  Other:      Therapist: Annabelle Siddiqui    Date: 10/10/2017 Time: 2:51 PM     Future Appointments  Date Time Provider Richard Forde   10/13/2017 2:30 PM [facility]   10/17/2017 11:30 AM [facility] PT [facility]   10/20/2017 2:00 PM [facility] PT [facility] 2 [facility]   10/24/2017 1:30 PM Amy Belcher PTA [facility]   10/27/2017 1:30 PM [facility] PT [facility] 2 [facility]   10/31/2017 1:00 PM Amy Belcher [facility]

## 2017-10-13 ENCOUNTER — HOSPITAL ENCOUNTER (OUTPATIENT)
Dept: PHYSICAL THERAPY | Age: 77
Discharge: HOME | End: 2017-10-13
Payer: MEDICARE

## 2017-10-13 PROCEDURE — 97530 THERAPEUTIC ACTIVITIES: CPT

## 2017-10-13 PROCEDURE — 97140 MANUAL THERAPY 1/> REGIONS: CPT

## 2017-10-13 NOTE — PROGRESS NOTES
PHYSICAL THERAPY - DAILY TREATMENT NOTE    Patient Name: Basil Banegas        Date: 10/13/2017  : 1940   yes Patient  Verified  Visit #:   3     Insurance: Payor: Erinn Kumari / Plan: [place] MEDICARE PART A & B / Product Type: Medicare /      In time: 997 Out time: 330   Total Treatment Time: 58     Medicare Time Tracking (below)   Total Timed Codes (min):  58 1:1 Treatment Time:  23     TREATMENT AREA =  Left hip pain [M25.552]    SUBJECTIVE  Pain Level (on 0 to 10 scale):  2  / 10   Medication Changes/New allergies or changes in medical history, any new surgeries or procedures?    no  If yes, update Summary List   Subjective Functional Status/Changes:  []  No changes reported     \"I am doing my HEP all the time, and walking a lot.  My wife tells me to walk better\"          OBJECTIVE      35/0 min Therapeutic Exercise:  [x]  See flow sheet   Rationale:      increase ROM, increase strength, improve coordination, improve balance and increase proprioception to improve the patients ability to  perform ADLs/prolong stding and amb/stairs/return to work with ease          8 min Manual Therapy: IASTM to incision site/glute med/quad/HS   Rationale:      decrease pain, increase ROM, increase tissue extensibility, decrease trigger points and increase postural awareness to improvethe patients ability to  perform ADLs/prolong stding and amb/stairs/return to work with ease         15 min Therapeutic Activity: stair training  SLS  MIP on airex  HK amb  tap ups   Rationale:    increase ROM, increase strength, improve coordination, improve balance and increase proprioception to improve the patients ability to  perform ADLs/prolong stding and amb/stairs with ease        min Patient Education:  yes  Reviewed HEP   []  Progressed/Changed HEP based on: Pt ed on importance and benefits of compliance with HEP, core strength/stability and proper posture; pt verbalized understanding          Other Objective/Functional Measures:    VCs + demo to perform proper technique for TE  initiated MIP on airex without c/o p! or LOB  1 UE> 1 finger support for SLS   demos 50% AROM HR  no difficulty with instruction to perform slow ecc lowering with step ups     Post Treatment Pain Level (on 0 to 10) scale:   0  / 10     ASSESSMENT  Assessment/Changes in Function:     Progressed there-ex without c/o increase p!  demos decrease hip flex during swing phase and stance time on L LE during gait; improved with VCs     []  See Progress Note/Recertification   Patient will continue to benefit from skilled PT services to modify and progress therapeutic interventions, address functional mobility deficits, address ROM deficits, address strength deficits, analyze and address soft tissue restrictions, analyze and cue movement patterns, analyze and modify body mechanics/ergonomics, assess and modify postural abnormalities and instruct in home and community integration to attain remaining goals. Progress toward goals / Updated goals: · Short Term Goals: To be accomplished in  2-3  Weeks:  1. Patient will be compliant with HEP in order to facilitate progression of therapeutic exercise and improve mobility. MET  2. Patient will improve L SLS time to >/= 10 seconds in order to progress independent ambulation. progressing, demos 30\" with 1 finger support  3. Patient will demonstrate >/= 4-/5 hip abduction strength to improve ease with ADLs     · Long Term Goals: To be accomplished in  4-6  Weeks:  1. Patient will be independent with HEP in order to demonstrate ability to perform therapeutic exercises and continue progressing functional mobility upon D/C  2. Patient will negotiate 10 steps independently with reciprocal pattern to improve ease with community ambulation  3. Patient will ambulate > 15 minutes safely without AD in order to improve independent ambulation  4.  Patient will improve FOTO score to 50/100 to demonstrate a meaningful improvement in functional mobility and increased quality of life     PLAN  []  Upgrade activities as tolerated yes Continue plan of care   []  Discharge due to :    []  Other:      Therapist: Nuzhat Keller PTA    Date: 10/13/2017 Time: 4:52 PM     Future Appointments  Date Time Provider Richard Forde   10/17/2017 11:30 AM [facility]   10/20/2017 2:00 PM [facility]   10/24/2017 1:30 PM [facility]   10/27/2017 1:30 PM [facility]   10/31/2017 1:00 PM [facility]

## 2017-10-17 ENCOUNTER — HOSPITAL ENCOUNTER (OUTPATIENT)
Dept: PHYSICAL THERAPY | Age: 77
Discharge: HOME | End: 2017-10-17
Payer: MEDICARE

## 2017-10-17 PROCEDURE — 97110 THERAPEUTIC EXERCISES: CPT

## 2017-10-17 PROCEDURE — 97140 MANUAL THERAPY 1/> REGIONS: CPT

## 2017-10-17 NOTE — PROGRESS NOTES
PHYSICAL THERAPY - DAILY TREATMENT NOTE    Patient Name: Harish Minaya        Date: 10/17/2017  : 1940   YES Patient  Verified  Visit #:   4     Insurance: Payor: Nima Palencia / Plan: [place] MEDICARE PART A & B / Product Type: Medicare /      In time: 809 Out time:    Total Treatment Time: 50     Medicare Time Tracking (below)   Total Timed Codes (min):  50 1:1 Treatment Time:  50     TREATMENT AREA =  L hip    SUBJECTIVE    Pain Level (on 0 to 10 scale): 2 / 10   Medication Changes/New allergies or changes in medical history, any new surgeries or procedures? NO    If yes, update Summary List   Subjective Functional Status/Changes:  []  No changes reported     Reports compliance with HEP. Very happy with cupping after last visit. Pt and wife requested help finding them online for personal purchase.      OBJECTIVE    Modalities Rationale:  Pain control   min [] Estim, type/location:                                      []  att     []  unatt     []  w/US     []  w/ice    []  w/heat    min []  Mechanical Traction: type/lbs                   []  pro   []  sup   []  int   []  cont    []  before manual    []  after manual    min []  Ultrasound, settings/location:      min []  Iontophoresis w/ dexamethasone, location:                                               []  take home patch       []  in clinic    min []  Ice     []  Heat    location/position:     min []  Vasopneumatic Device, press/temp:    10 min [x]  Other: IASTM cupping   [] Skin assessment post-treatment (if applicable):    [x]  intact    [x]  redness- no adverse reaction     []redness - adverse reaction:      40 min Therapeutic Exercise:  [x]  See flow sheet   Rationale:      increase ROM, increase strength, improve coordination, improve balance and increase proprioception to improve the patients functional mobility in prep for RTW.      min Therapeutic Activity:    Rationale:      min Neuromuscular Re-ed:    Rationale:     10 min Manual Therapy: Cupping, Xfrict, and effleurage. Rationale:      decrease pain, increase ROM, decrease edema  and improve scar mobility to improve patient's ability to return to full ROM and function. min Gait Training:    Rationale:       min Patient Education:  YES  Reviewed HEP   []  Progressed/Changed HEP based on: Other Objective/Functional Measures:    Difficulty with motor programming and coordination with standing hip 3-way progression: EO step, EO taps, EC. Multiple LOB with EC most difficult was with L stance and R LE backward/ext taps. Post Treatment Pain Level (on 0 to 10) scale:   0  / 10     ASSESSMENT    Assessment/Changes in Function:     Progressing and nallely well. Stil needs significant cue'g for proper tech, and guarding with balance activities to prevent fall. []  See Progress Note/Recertification   Patient will continue to benefit from skilled PT services to analyze,, cue,, progress,, modify,, demonstrate,, instruct, and address, movement patterns,, therapeutic interventions,, soft tissue restrictions,, ROM,, strength,, functional mobility, and home and community integration, to attain remaining goals.    Progress toward goals / Updated goals:         PLAN    []  Upgrade activities as tolerated YES Continue plan of care   []  Discharge due to :    []  Other:      Therapist: Janice Kirkland, PT    Date: 10/17/2017 Time: 1:00 PM   Future Appointments  Date Time Provider Richard Forde   10/20/2017 [facility]   10/24/2017 1:30 PM [facility]   10/27/2017 1:30 PM [facility]   10/31/2017 1:00 PM [facility]

## 2017-10-20 ENCOUNTER — HOSPITAL ENCOUNTER (OUTPATIENT)
Dept: PHYSICAL THERAPY | Age: 77
Discharge: HOME | End: 2017-10-20
Payer: MEDICARE

## 2017-10-20 PROCEDURE — 97140 MANUAL THERAPY 1/> REGIONS: CPT

## 2017-10-20 PROCEDURE — 97110 THERAPEUTIC EXERCISES: CPT

## 2017-10-20 NOTE — PROGRESS NOTES
PHYSICAL THERAPY - DAILY TREATMENT NOTE    Patient Name: Marty Medrano        Date: 10/20/2017  : 1940   YES Patient  Verified  Visit #:   5     Insurance: Payor: Kimberly Ge / Plan: [place] MEDICARE PART A & B / Product Type: Medicare /      In time: 1400 Out time: 9956   Total Treatment Time: 54     Medicare Time Tracking (below)   Total Timed Codes (min):  55 1:1 Treatment Time:  55     TREATMENT AREA =  L hip    SUBJECTIVE    Pain Level (on 0 to 10 scale):  1  / 10   Medication Changes/New allergies or changes in medical history, any new surgeries or procedures? NO    If yes, update Summary List   Subjective Functional Status/Changes:  []  No changes reported     Pt states cupping set arrived and he has added to HEP. Feels he is progressing well. Walked 2.5 mi yesterday with pain increase.          OBJECTIVE    Modalities Rationale:  Decrease pain and inflammation   min [] Estim, type/location:                                      []  att     []  unatt     []  w/US     []  w/ice    []  w/heat    min []  Mechanical Traction: type/lbs                   []  pro   []  sup   []  int   []  cont    []  before manual    []  after manual    min []  Ultrasound, settings/location:      min []  Iontophoresis w/ dexamethasone, location:                                               []  take home patch       []  in clinic    min []  Ice     []  Heat    location/position:     min []  Vasopneumatic Device, press/temp:     min []  Other:    [] Skin assessment post-treatment (if applicable):    []  intact    [x]  redness- no adverse reaction     []redness - adverse reaction:      45 min Therapeutic Exercise:  [x]  See flow sheet   Rationale:      increase ROM, increase strength, improve coordination, improve balance and increase proprioception to improve the patients ability to perform IADLs without pain increase      min Therapeutic Activity:    Rationale:      min Neuromuscular Re-ed:    Rationale:     10 min Manual Therapy:    Rationale:      decrease pain and increase tissue extensibility to improve patient's ability to don/doff pants without pain     min Gait Training:    Rationale:       min Patient Education:  YES  Reviewed HEP   []  Progressed/Changed HEP based on: Other Objective/Functional Measures:    Posture much improved. Minimal ant pelvic tilt or exaggerated lumbar lordosis. Post Treatment Pain Level (on 0 to 10) scale:   0  / 10     ASSESSMENT    Assessment/Changes in Function:     Improving quickly. []  See Progress Note/Recertification   Patient will continue to benefit from skilled PT services to analyze,, cue,, progress,, modify,, demonstrate,, instruct, and address, movement patterns,, therapeutic interventions,, postural abnormalities,, soft tissue restrictions,, ROM,, strength,, functional mobility,, body mechanics/ergonomics, and home and community integration, to attain remaining goals. Progress toward goals / Updated goals: · Short Term Goals: To be accomplished in  2-3  Weeks: (10/27)  1. Patient will be compliant with HEP in order to facilitate progression of therapeutic exercise and improve mobility- MET 10/20  2. Patient will improve L SLS time to >/= 10 seconds in order to progress independent ambulation  3. Patient will demonstrate >/= 4-/5 hip abduction strength to improve ease with ADLs     · Long Term Goals: To be accomplished in  4-6  Weeks:  1. Patient will be independent with HEP in order to demonstrate ability to perform therapeutic exercises and continue progressing functional mobility upon D/C  2. Patient will negotiate 10 steps independently with reciprocal pattern to improve ease with community ambulation  3. Patient will ambulate > 15 minutes safely without AD in order to improve independent ambulation  4.  Patient will improve FOTO score to 50/100 to demonstrate a meaningful improvement in functional mobility and increased quality of life       PLAN    []  Upgrade activities as tolerated YES Continue plan of care   []  Discharge due to :    []  Other:      Therapist: Harinder Iverson, PT    Date: 10/20/2017 Time: 3:06 PM   Future Appointments  Date Time Provider Richard Forde   10/24/2017 1:30 PM [facility]   10/27/2017 1:30 PM Jose Hurst [facility]   10/31/2017 1:00 PM [facility]

## 2017-10-24 ENCOUNTER — HOSPITAL ENCOUNTER (OUTPATIENT)
Dept: PHYSICAL THERAPY | Age: 77
Discharge: HOME | End: 2017-10-24
Payer: MEDICARE

## 2017-10-24 PROCEDURE — 97530 THERAPEUTIC ACTIVITIES: CPT

## 2017-10-24 PROCEDURE — 97110 THERAPEUTIC EXERCISES: CPT

## 2017-10-24 NOTE — PROGRESS NOTES
PHYSICAL THERAPY - DAILY TREATMENT NOTE    Patient Name: Jovan Devi        Date: 10/24/2017  : 1940   yes Patient  Verified  Visit #:   Insurance: Payor: Linder Cheadle / Plan: [place] & [place] / Product Type: Medicare /      In time: 119 Out time: 156   Total Treatment Time: 37     Medicare Time Tracking (below)   Total Timed Codes (min):  37 1:1 Treatment Time:  37     TREATMENT AREA =  Left hip pain [M25.552]    SUBJECTIVE  Pain Level (on 0 to 10 scale):  0  / 10   Medication Changes/New allergies or changes in medical history, any new surgeries or procedures?    no  If yes, update Summary List   Subjective Functional Status/Changes:  []  No changes reported     \"I am feeling great!  I am walking better\"          OBJECTIVE    17 min Therapeutic Exercise:  [x]  See flow sheet   Rationale:      increase ROM, increase strength, improve coordination, improve balance and increase proprioception to improve the patients ability to  perform ADLs/prolong stding and amb/stairs/return to work with ease       15 min Therapeutic Activity: stair training  tap ups without UE support  MIP on airex   Rationale:    increase ROM, increase strength, improve coordination, improve balance and increase proprioception to improve the patients ability to  perform ADLs/prolong stding and amb/stairs with ease        min Patient Education:  yes  Reviewed HEP   []  Progressed/Changed HEP based on: Pt ed on importance and benefits of compliance with HEP, core strength/stability and proper posture; pt verbalized understanding     Other Objective/Functional Measures:    VCs + demo to perform proper technique for TE  initiated lat grape vine to 2nd step with CGA> SBA without LOB    added turns to MIP on airex without UE support     Post Treatment Pain Level (on 0 to 10) scale:   0  / 10     ASSESSMENT  Assessment/Changes in Function:     Progressed there-ex without c/o increase p!  achieved LTG #3     []  See Progress Note/Recertification   Patient will continue to benefit from skilled PT services to modify and progress therapeutic interventions, address functional mobility deficits, address ROM deficits, address strength deficits, analyze and address soft tissue restrictions, analyze and cue movement patterns, analyze and modify body mechanics/ergonomics, assess and modify postural abnormalities and instruct in home and community integration to attain remaining goals. Progress toward goals / Updated goals: · Short Term Goals: To be accomplished in  2-3  Weeks:  1. Patient will be compliant with HEP in order to facilitate progression of therapeutic exercise and improve mobility. MET  2. Patient will improve L SLS time to >/= 10 seconds in order to progress independent ambulation. progressing, demos 30\" with 1 finger support  3. Patient will demonstrate >/= 4-/5 hip abduction strength to improve ease with ADLs     · Long Term Goals: To be accomplished in  4-6  Weeks:  1. Patient will be independent with HEP in order to demonstrate ability to perform therapeutic exercises and continue progressing functional mobility upon D/C  2. Patient will negotiate 10 steps independently with reciprocal pattern to improve ease with community ambulation  3. Patient will ambulate > 15 minutes safely without AD in order to improve independent ambulation. MET; amb 2 miles   4.  Patient will improve FOTO score to 50/100 to demonstrate a meaningful improvement in functional mobility and increased quality of life     PLAN  []  Upgrade activities as tolerated yes Continue plan of care   []  Discharge due to :    []  Other:      Therapist: Ya Blandon PTA    Date: 10/24/2017 Time: 1:30 PM     Future Appointments  Date Time Provider Richard Forde   10/24/2017 1:30 PM [facility]   10/27/2017 1:30 PM Pierce Dubin [facility]   10/31/2017 1:00 PM [facility]

## 2017-10-27 ENCOUNTER — APPOINTMENT (OUTPATIENT)
Dept: PHYSICAL THERAPY | Age: 77
End: 2017-10-27
Payer: MEDICARE

## 2017-10-30 ENCOUNTER — HOSPITAL ENCOUNTER (OUTPATIENT)
Dept: PHYSICAL THERAPY | Age: 77
Discharge: HOME | End: 2017-10-30
Payer: MEDICARE

## 2017-10-30 PROCEDURE — 97110 THERAPEUTIC EXERCISES: CPT

## 2017-10-30 PROCEDURE — 97530 THERAPEUTIC ACTIVITIES: CPT

## 2017-10-30 NOTE — PROGRESS NOTES
PHYSICAL THERAPY - DAILY TREATMENT NOTE    Patient Name: Michoacano Snatos        Date: 10/30/2017  : 1940   YES Patient  Verified  Visit #:   7     Insurance: Payor: Savanna Ruiz / Plan: [place] MEDICARE PART A & B / Product Type: Medicare /      In time: 1:55 Out time: 2:33   Total Treatment Time: 38     Medicare Time Tracking (below)   Total Timed Codes (min):  38  1:1 Treatment Time:  38     TREATMENT AREA =  Left hip pain [M25.552]    SUBJECTIVE    Pain Level (on 0 to 10 scale):  1  / 10   Medication Changes/New allergies or changes in medical history, any new surgeries or procedures? NO     If yes, update Summary List   Subjective Functional Status/Changes:  []  No changes reported     \"I don't have any issue with anything. Doctor said I was good to finish up tomorrow. \"          OBJECTIVE      8 min Therapeutic Exercise:  [x]  See flow sheet   Rationale:      increase ROM and increase strength to improve the patients ability to perform ADL's with improved LE strength and flexibility. 30 min Therapeutic Activity: Lateral ambulation, SLS, standing marches, star taps, relative SLS on foam with hip 3-way   Rationale: To perform ADL's with improved safety and efficiency. min Patient Education:  YES  Reviewed HEP   []  Progressed/Changed HEP based on:   Patient reports compliance     Other Objective/Functional Measures:    Pt was able to perform tap-downs with minimal difficulty and no pain. Pt can SLS 30 sec B with minimal sway. Pt does require lots of vc's to perform hip hikes correctly.       Post Treatment Pain Level (on 0 to 10) scale:   0  / 10     ASSESSMENT    Assessment/Changes in Function:     Pt presents to PT independent with hip exercise program and ready for DC NV.     []  See Progress Note/Recertification   Patient will continue to benefit from skilled PT services to modify and progress therapeutic interventions, address functional mobility deficits, address ROM deficits, address strength deficits, analyze and address soft tissue restrictions, analyze and cue movement patterns and analyze and modify body mechanics/ergonomics to attain remaining goals. Progress toward goals / Updated goals: Will reassess NV.      PLAN    [x]  Upgrade activities as tolerated YES Continue plan of care   []  Discharge due to :    []  Other:      Therapist: Kvng Daniel    Date: 10/30/2017 Time: 2:20 PM     Future Appointments  Date Time Provider Richard Forde   10/31/2017 1:00 PM Kishan Zelaya [facility]

## 2017-10-31 ENCOUNTER — HOSPITAL ENCOUNTER (OUTPATIENT)
Dept: PHYSICAL THERAPY | Age: 77
Discharge: HOME | End: 2017-10-31
Payer: MEDICARE

## 2017-10-31 PROCEDURE — 97530 THERAPEUTIC ACTIVITIES: CPT

## 2017-10-31 PROCEDURE — 97110 THERAPEUTIC EXERCISES: CPT

## 2017-10-31 PROCEDURE — G8980 MOBILITY D/C STATUS: HCPCS

## 2017-10-31 PROCEDURE — G8979 MOBILITY GOAL STATUS: HCPCS

## 2017-10-31 NOTE — PROGRESS NOTES
PHYSICAL THERAPY - DAILY TREATMENT NOTE    Patient Name: Shade Uribe        Date: 10/31/2017  : 1940   yes Patient  Verified  Visit #:  8 of  8  Insurance: Payor: [employer] / Plan: [place] MEDICARE PART A & B / Product Type: Medicare /      In time: 105 Out time: 145   Total Treatment Time: 40     Medicare Time Tracking (below)   Total Timed Codes (min):  40 1:1 Treatment Time:  40     TREATMENT AREA =  Left hip pain [M25.552]    SUBJECTIVE  Pain Level (on 0 to 10 scale):  0  / 10   Medication Changes/New allergies or changes in medical history, any new surgeries or procedures?    no  If yes, update Summary List   Subjective Functional Status/Changes:  []  No changes reported     \"I feel great, I can walk 5 miles 2x a day with my wife.  I have no difficulty with the stairs\"          OBJECTIVE    25 min Therapeutic Exercise:  [x]  See flow sheet   Rationale:      increase ROM, increase strength, improve coordination, improve balance and increase proprioception to improve the patients ability to  perform ADLs/prolong stding and amb/stairs/return to work with ease       15 min Therapeutic Activity: stair training  HRs for push off and TRs for ankle HS during gait  FOTO assessment   Rationale:    increase ROM, increase strength, improve coordination, improve balance and increase proprioception to improve the patients ability to  perform ADLs/prolong stding and amb/stairs with ease        min Patient Education:  yes  Reviewed HEP   []  Progressed/Changed HEP based on: Pt ed on importance and benefits of compliance with HEP, core strength/stability and proper posture; pt verbalized understanding  See updated HEP in chart       Other Objective/Functional Measures:    VCs + demo to perform proper technique for TE  FOTO=69  negotiated up and down 3 steps x 5  with 1 HRs safely with reciprocal pattern     Post Treatment Pain Level (on 0 to 10) scale:   0  / 10     ASSESSMENT  Assessment/Changes in Function: See D/C note       []  See Progress Note/Recertification   Patient will continue to benefit from skilled PT services to See D/C note     Progress toward goals / Updated goals: Mobility   Goal  CJ= 20-39%  D/C  CJ= 20-39%. The severity rating is based on the FOTO Score       · Long Term Goals: To be accomplished in  4-6  Weeks:  1. Patient will be independent with HEP in order to demonstrate ability to perform therapeutic exercises and continue progressing functional mobility upon D/C. MET  2. Patient will negotiate 10 steps independently with reciprocal pattern to improve ease with community ambulation. MET; negotiated up and down 3 steps x 5  with 1 HRs safely with reciprocal pattern  3. Patient will ambulate > 15 minutes safely without AD in order to improve independent ambulation. MET; amb 2 miles   4. Patient will improve FOTO score to 50/100 to demonstrate a meaningful improvement in functional mobility and increased quality of life. MET-69     PLAN  []  Upgrade activities as tolerated yes Continue plan of care   []  Discharge due to :    []  Other: Pt has achieved all LTGs. Pt D/C with instructions to cont HEP Independently.      Therapist: Kishan Zelaya PTA    Date: 10/31/2017 Time: 1:30 PM     Future Appointments  Date Time Provider Richard Forde   10/31/2017 1:00 PM Kishan Zelaya PTA [facility]

## 2017-11-01 NOTE — PROGRESS NOTES
[address] PHYSICAL THERAPY  [address] [place] [address], [address] - Phone: [phone]  Fax: [phone] SUMMARY FOR PHYSICAL THERAPY          Patient Name: Jovan Devi : 1940   Treatment/Medical Diagnosis: Left hip pain [M25.552]   Onset Date: 17    Referral Source: Serene Moya MD [facility]): 10/6/17   Prior Hospitalization: See Medical History Provider #: [id number]   Prior Level of Function: Hx L hip OA, ambulating mod(I) SPC   Comorbidities: Coronary Artery disease, diverticulitis, vitamin D deficiency, Hypercholesteremia, HTN, Sleep apnea, Hx L lung lobectomy, Hx tobacco use   Medications: Verified on Patient Summary List   Visits from [facility]: 8 Missed Visits: 0     Goal/Measure of Progress Goal Met? 1. Patient will be independent with HEP in order to demonstrate ability to perform therapeutic exercises and continue progressing functional mobility upon D/C. Status at last Eval: NA Current Status: independent yes   2. Patient will negotiate 10 steps independently with reciprocal pattern to improve ease with community ambulation   Status at last Eval: nonreciprocal Current Status: reciprocal yes   3. Patient will ambulate > 15 minutes safely without AD in order to improve independent ambulation. Status at last Eval: SPC Current Status: No AD yes   4. Patient will improve FOTO score to 50/100 to demonstrate a meaningful improvement in functional mobility and increased quality of life. Status at last Eval: 40 Current Status: 69 yes     Key Functional Changes/Progress:  Pt's FOTO score now 69 indicating 31% limitation in functional ability. The pt demonstrates no MMT or functional deficits at this time. G-Codes (GP): Mobility  [id number] Goal  CJ= 20-39%  D/C  CJ= 20-39%. The severity rating is based on the FOTO Score     Assessments/Recommendations: Discontinue therapy.  Progressing towards or have reached established goals.    If you have any questions/comments please contact us directly at [phone]. Thank you for allowing us to assist in the care of your patient. Therapist Signature: Jerica Cummings DPT Date: 11/1/2017 (late entry from 10/31/2017)   Reporting Period: 10/6/17 - 10/31/17 Time: 1:32 PM     ===========================================================================================  NOTE TO PHYSICIAN:  PLEASE COMPLETE THE ORDERS BELOW AND FAX TO   [facility] [place] [facility]: [phone]. If you are unable to process this request in 24 hours please contact our office: [phone].    ___ I have read the above report and request that my patient continue as recommended.   ___ I have read the above report and request that my patient continue therapy with the following changes/special instructions: ________________________________________________   ___ I have read the above report and request that my patient be discharged from therapy.      Physician Signature:        Date:       Time:          \

## 2017-12-18 RX ORDER — ISOSORBIDE MONONITRATE 30 MG/1
30 TABLET, EXTENDED RELEASE ORAL
Qty: 15 TAB | Refills: 0 | Status: SHIPPED | OUTPATIENT
Start: 2017-12-18 | End: 2017-12-30 | Stop reason: SDUPTHER

## 2018-01-02 RX ORDER — ISOSORBIDE MONONITRATE 30 MG/1
TABLET, EXTENDED RELEASE ORAL
Qty: 15 TAB | Refills: 0 | Status: SHIPPED | OUTPATIENT
Start: 2018-01-02 | End: 2018-09-11 | Stop reason: ALTCHOICE

## 2018-01-02 RX ORDER — ISOSORBIDE MONONITRATE 30 MG/1
TABLET, EXTENDED RELEASE ORAL
Qty: 90 TAB | Refills: 2 | Status: SHIPPED | OUTPATIENT
Start: 2018-01-02 | End: 2020-12-03

## 2018-01-08 RX ORDER — CARVEDILOL 3.12 MG/1
TABLET ORAL
Qty: 180 TAB | Refills: 1 | Status: SHIPPED | OUTPATIENT
Start: 2018-01-08 | End: 2018-07-07 | Stop reason: SDUPTHER

## 2018-07-07 RX ORDER — CARVEDILOL 3.12 MG/1
TABLET ORAL
Qty: 180 TAB | Refills: 1 | Status: SHIPPED | OUTPATIENT
Start: 2018-07-07 | End: 2022-11-03

## 2018-07-12 ENCOUNTER — TELEPHONE (OUTPATIENT)
Dept: CARDIOLOGY CLINIC | Age: 78
End: 2018-07-12

## 2018-09-11 ENCOUNTER — OFFICE VISIT (OUTPATIENT)
Dept: CARDIOLOGY CLINIC | Age: 78
End: 2018-09-11

## 2018-09-11 VITALS
OXYGEN SATURATION: 96 % | BODY MASS INDEX: 29.54 KG/M2 | DIASTOLIC BLOOD PRESSURE: 66 MMHG | WEIGHT: 183 LBS | HEART RATE: 89 BPM | SYSTOLIC BLOOD PRESSURE: 88 MMHG

## 2018-09-11 DIAGNOSIS — I10 ESSENTIAL HYPERTENSION WITH GOAL BLOOD PRESSURE LESS THAN 140/90: ICD-10-CM

## 2018-09-11 DIAGNOSIS — I25.10 CORONARY ARTERY DISEASE DUE TO LIPID RICH PLAQUE: Primary | ICD-10-CM

## 2018-09-11 DIAGNOSIS — I25.83 CORONARY ARTERY DISEASE DUE TO LIPID RICH PLAQUE: Primary | ICD-10-CM

## 2018-09-11 DIAGNOSIS — E78.00 PURE HYPERCHOLESTEROLEMIA: ICD-10-CM

## 2018-09-11 RX ORDER — METFORMIN HYDROCHLORIDE 500 MG/1
TABLET ORAL EVERY EVENING
COMMUNITY
Start: 2018-09-03

## 2018-09-11 RX ORDER — BISMUTH SUBSALICYLATE 262 MG
TABLET,CHEWABLE ORAL
COMMUNITY
End: 2018-09-11 | Stop reason: SDUPTHER

## 2018-09-11 RX ORDER — ATORVASTATIN CALCIUM 40 MG/1
40 TABLET, FILM COATED ORAL DAILY
Qty: 30 TAB | Refills: 6 | Status: SHIPPED | OUTPATIENT
Start: 2018-09-11

## 2018-09-11 NOTE — PROGRESS NOTES
1. Have you been to the ER, urgent care clinic since your last visit? Hospitalized since your last visit? No  
 
2. Have you seen or consulted any other health care providers outside of the [address] [place] [address] since your last visit? Include any pap smears or colon screening.  No

## 2018-09-11 NOTE — TELEPHONE ENCOUNTER
Verbal order and read back per Elva Angulo MD  Increase to 40mg Daily of Lipitor.  He instructed patient while [facility] at [facility]

## 2018-09-11 NOTE — MR AVS SNAPSHOT
[address] [facility] [address] Located within [facility] [id number] 
[phone] Patient: Harish Minaya MRN: [id number] :1940 Visit Information Date & Time Provider Department Dept. Phone Encounter #  
 2018  1:00 PM Nicholas Hi  [facility] Specialist at [facility] [phone] [id number] Follow-up Instructions Return in about 1 year (around 2019). Upcoming Health Maintenance Date Due DTaP/Tdap/Td series (1 - Tdap) 10/17/1963 ZOSTER VACCINE AGE 60> 2002 GLAUCOMA SCREENING Q2Y 10/17/2007 Pneumococcal 65+ Low/Medium Risk (1 of 2 - PCV13) 10/17/2007 MEDICARE YEARLY EXAM 3/14/2018 Influenza Age 5 to Adult 2018 Allergies as of 2018  Review Complete On: 2018 By: Hayden Lizama LPN No Known Allergies Current Immunizations  Never Reviewed No immunizations on file. Not reviewed this visit Vitals BP Pulse Weight(growth percentile) SpO2 BMI Smoking Status (!) 88/66 89 183 lb (83 kg) 96% 29.54 kg/m2 Former Smoker BMI and BSA Data Body Mass Index Body Surface Area  
 29.54 kg/m 2 1.97 m 2 Preferred Pharmacy Pharmacy Name Phone [facility], [facility] [phone] Your Updated Medication List  
  
   
This list is accurate as of 18  1:24 PM.  Always use your most recent med list.  
  
  
  
  
 aspirin 81 mg tablet Take 162 mg by mouth daily. atorvastatin 20 mg tablet Commonly known as:  LIPITOR  
TAKE 1 TABLET DAILY  
  
 carvedilol 3.125 mg tablet Commonly known as:  COREG  
TAKE 1 TABLET TWICE A DAY WITH MEALS  
  
 ergocalciferol 50,000 unit capsule Commonly known as:  ERGOCALCIFEROL Take 50,000 Units by mouth. Twice a month FISH OIL 1,000 mg Cap Generic drug:  omega-3 fatty acids-vitamin e Take 1 Cap by mouth daily. glucosamine-chondroitin 500-400 mg Cap Commonly known as:  20 [facility] Take 0.5 Caps by mouth daily. isosorbide mononitrate ER 30 mg tablet Commonly known as:  IMDUR  
TAKE 1 TABLET DAILY  
  
 lisinopril 5 mg tablet Commonly known as:  Jacqui Salas Take 0.5 Tabs by mouth daily. metFORMIN 500 mg tablet Commonly known as:  GLUCOPHAGE  
  
 multivitamin tablet Commonly known as:  ONE A DAY Take 1 Tab by mouth daily. nitroglycerin 0.4 mg SL tablet Commonly known as:  NITROSTAT  
1 Tab by SubLINGual route as needed for Chest Pain. Follow-up Instructions Return in about 1 year (around 9/11/2019). Patient Instructions Take half -tablet of lisinopril Introducing [facility] SERVICES! [employer] introduces [employer] patient portal. Now you can access parts of your medical record, email your doctor's office, and request medication refills online. 1. In your internet browser, go to [email or web address] 2. Click on the First Time User? Click Here link in the Sign In box. You will see the New Member Sign Up page. 3. Enter your [employer] Access Code exactly as it appears below. You will not need to use this code after youve completed the sign-up process. If you do not sign up before the expiration date, you must request a new code. · [employer] Access Code: [email or web address] Expires: 12/10/2018  1:24 PM 
 
4. Enter the last four digits of your Social Security Number (xxxx) and Date of Birth (mm/dd/yyyy) as indicated and click Submit. You will be taken to the next sign-up page. 5. Create a [employer] ID. This will be your [employer] login ID and cannot be changed, so think of one that is secure and easy to remember. 6. Create a [employer] password. You can change your password at any time. 7. Enter your Password Reset Question and Answer. This can be used at a later time if you forget your password. 8. Enter your e-mail address. You will receive e-mail notification when new information is available in [address] 9. Click Sign Up. You can now view and download portions of your medical record. 10. Click the Download Summary menu link to download a portable copy of your medical information. If you have questions, please visit the Frequently Asked Questions section of the [employer] website. Remember, [employer] is NOT to be used for urgent needs. For medical emergencies, dial 911. Now available from your iPhone and Android! Please provide this summary of care documentation to your next provider. Your primary care clinician is listed as Kamryn Pfeiffer. If you have any questions after today's visit, please call [phone].

## 2018-09-11 NOTE — PROGRESS NOTES
[facility] Mr. Nixon Velasco is a 78 y.o. male with history of coronary artery disease s/p LAD stent (06/2014), sleep apnea, hypertension, hyperlipidemia and obesity. Mr. Nixon Velasco is here today for follow up appointment. Denies any resting or exertional chest pain or chest pressure to suggest angina or any dyspnea to suggest heart failure. No presyncope or syncope Denies any PND or LE edema. Taking all medications regularly. Admit that taking lisinopril full dose. Denies any nausea, vomiting, abdominal pain, fever, chills, sputum production. No hematuria or other bleeding complaints Past Medical History:  
Diagnosis Date  Asbestosis(501)  CAD (coronary artery disease)   
 (06/2014) Mid LAD (3.25 X 23 mm ) Xience ROSA M  CPAP (continuous positive airway pressure) dependence  Hemorrhoids  Hypercholesteremia  Hypertension  Sleep apnea  Vitamin D deficiency Past Surgical History:  
Procedure Laterality Date  CHEST SURGERY PROCEDURE UNLISTED  1971  
 lobectomy left lung  HX APPENDECTOMY Current Outpatient Prescriptions Medication Sig  
 metFORMIN (GLUCOPHAGE) 500 mg tablet  carvedilol (COREG) 3.125 mg tablet TAKE 1 TABLET TWICE A DAY WITH MEALS  isosorbide mononitrate ER (IMDUR) 30 mg tablet TAKE 1 TABLET DAILY  atorvastatin (LIPITOR) 20 mg tablet TAKE 1 TABLET DAILY  lisinopril (PRINIVIL, ZESTRIL) 5 mg tablet Take 0.5 Tabs by mouth daily.  glucosamine-chondroitin (ARTHX) 500-400 mg cap Take 0.5 Caps by mouth daily.  nitroglycerin (NITROSTAT) 0.4 mg SL tablet 1 Tab by SubLINGual route as needed for Chest Pain.  aspirin 81 mg tablet Take 162 mg by mouth daily.  ergocalciferol (ERGOCALCIFEROL) 50,000 unit capsule Take 50,000 Units by mouth. Twice a month  omega-3 fatty acids-vitamin e (FISH OIL) 1,000 mg cap Take 1 Cap by mouth daily.  multivitamin (ONE A DAY) tablet Take 1 Tab by mouth daily. No current facility-administered medications for this visit. Allergies and Sensitivities: 
No Known Allergies Family History: No family history on file. Social History: 
Social History Substance Use Topics  Smoking status: Former Smoker  Smokeless tobacco: Never Used  Alcohol use 1.0 oz/week 2 Glasses of wine per week Comment: 16 oz daily He  reports that he has quit smoking. He has never used smokeless tobacco.  He  reports that he drinks about 1.0 oz of alcohol per week Review of Systems: 
Cardiac symptoms as noted above in HPI. All others negative. Denies fatigue, malaise, skin rash, joint pain, blurring vision, photophobia, neck pain, hemoptysis, chronic cough, nausea, vomiting, burning micturition, BRBPR, chronic headaches. Physical Exam: 
BP Readings from Last 3 Encounters:  
09/11/18 (!) 88/66  
06/06/17 114/60  
04/14/17 131/85 Pulse Readings from Last 3 Encounters:  
09/11/18 89  
06/06/17 65  
04/14/17 66 Wt Readings from Last 3 Encounters:  
09/11/18 183 lb (83 kg) 06/06/17 192 lb (87.1 kg) 04/14/17 180 lb (81.6 kg) Constitutional: Oriented to person, place, and time. HENT: Head: Normocephalic and atraumatic. Neck: No JVD present. Cardiovascular: Regular rhythm. No murmur, gallop or rubs appreciated Lung: Breath sounds normal. No respiratory distress. No ronchi or rales appreciated Abdominal: No tenderness. No rebound and no guarding. Musculoskeletal: There is no lower extremity edema. Review of Data LABS:  
Lab Results Component Value Date/Time Sodium 136 09/07/2014 11:15 AM  
 Potassium 4.0 09/07/2014 11:15 AM  
 Chloride 103 09/07/2014 11:15 AM  
 CO2 29 09/07/2014 11:15 AM  
 Glucose 218 (H) 09/07/2014 11:15 AM  
 BUN 12 09/07/2014 11:15 AM  
 Creatinine 1.01 09/07/2014 11:15 AM  
 
No flowsheet data found. Lab Results Component Value Date/Time ALT (SGPT) 55 05/31/2014 02:31 AM  
 
No results found for: HBA1C, [id number] 
 
EKG 
(07/2014) Sinus rhythm at 73 bpm. No ST-t changes of ischemia. Normal IN and QRS. Normal axis ECHO (09/16) SUMMARY: 
Left ventricle: Systolic function was at the lower limits of normal. 
Ejection fraction was estimated to be 55 %. There were no regional wall 
motion abnormalities. Doppler parameters were consistent with abnormal 
left ventricular relaxation (grade 1 diastolic dysfunction). Right ventricle: Systolic function was normal. 
Mitral valve: There was no evidence for stenosis. There was trivial 
regurgitation. Aortic valve: There was no stenosis. There was trivial regurgitation. Tricuspid valve: There was trivial regurgitation. STRESS TEST (06/2014) 1. Pharmacologic nuclear stress test using Lexiscan. 2. No EKG evidence of ischemia during Lexiscan infusion. 3. Medium to large area of reversible defect which is moderate in intensity and involving mid to distal anterior and anteroseptal wall involving apex, suggesting ischemia. No convincing evidence of infarct. 4. Gated images showed calculated ejection fraction of 69% without any obvious wall motion abnormality. CATHETERIZATION (06/2014) LVGram:(manual injection) EF: 55% without significant wall motion abnormalities Mitral Regurgitation: No significant No significant gradient across the aortic valve LVEDP ~12-14 mm hg  
Coronary angiography:  
Dominance: Right LM: Angiographically normal  
LAD: Mid tubular 95% stenosis otherwise normal, Large D1: prox 20% luminal irregularities otherwise normal  
LCX: /OM: Angiographically normal  
RCA: Dominant, 10-20% prox luminal irregularities otherwise Angiographically normal  
PCI Detail for Mid LAD stenosis Pre-dilation 2.5 X 15 mm TREK , 3.25 X 23 mm Xience ROSA M , Post-dilation 3.5 X 15 mm TREK NC baloon @ 14-16 génesis Stenosis from 95% --> 0 % STRESS TEST(09/16) SUMMARY/IMPRESSION 1. Pharmacologic nuclear stress test using Lexiscan. 2. No EKG evidence of ischemia during Lexiscan infusion. 3. No convincing evidence of reversible defect to suggest ongoing major ischemia. 4. Diaphragmatic attenuation artifact noted over inferior border of the heart. Very small fixed apical defect, which is most likely from apical thinning versus artifact. 5. Calculated ejection fraction of 71% without any obvious wall motion abnormality. End-diastolic volume of 66 mL. 
  
IMPRESSION & PLAN: 
Jeffrey Bunn is a 78 y.o. male with coronary artery disease, hypertension, hyperlipidemia and obesity and sleep apnea. Coronary artery disease:  He had an LAD stent in 2014. Nuclear stress and echo in 09/16 without any RWMA and ischemia. Continue BB, imdur, ASA and statin No S/L NTG since last visit. Continue medical management. Hypertension:  Bp is 88/46 mm Hg today. On imdur, coreg, lisinopril. Will reduce lisinopril dose to 2.5 mg daily. Was suppose to be on that dose but taking 5 mg daily currently. Advise to reduce in half. BP check at home regularly and call with numbers. Asymptomatic. Hyperlipidemia:  He is on low intensity atorvastatin without any side effect. Will increase to 40 mg daily. Obesity and sleep apnea: Importance of diet and exercise was discussed and use of CPAP. This plan was discussed with patient who is in agreement. Thank you for allowing me to participate in patient care. Please feel free to call me if you have any question or concern.

## 2019-01-27 ENCOUNTER — APPOINTMENT (OUTPATIENT)
Dept: CT IMAGING | Age: 79
End: 2019-01-27
Attending: PHYSICIAN ASSISTANT
Payer: MEDICARE

## 2019-01-27 ENCOUNTER — HOSPITAL ENCOUNTER (EMERGENCY)
Age: 79
Discharge: HOME | End: 2019-01-27
Attending: EMERGENCY MEDICINE
Payer: MEDICARE

## 2019-01-27 VITALS
BODY MASS INDEX: 30.53 KG/M2 | TEMPERATURE: 98 F | RESPIRATION RATE: 16 BRPM | HEART RATE: 82 BPM | HEIGHT: 66 IN | OXYGEN SATURATION: 97 % | SYSTOLIC BLOOD PRESSURE: 152 MMHG | DIASTOLIC BLOOD PRESSURE: 75 MMHG | WEIGHT: 190 LBS
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
## 2019-01-27 DIAGNOSIS — S16.1XXA STRAIN OF NECK MUSCLE, INITIAL ENCOUNTER: ICD-10-CM

## 2019-01-27 PROCEDURE — 99283 EMERGENCY DEPT VISIT LOW MDM: CPT

## 2019-01-27 PROCEDURE — 72125 CT NECK SPINE W/O DYE: CPT

## 2019-01-27 PROCEDURE — 70450 CT HEAD/BRAIN W/O DYE: CPT

## 2019-01-27 RX ORDER — CYCLOBENZAPRINE HCL 10 MG
10 TABLET ORAL
Qty: 15 TAB | Refills: 0 | Status: SHIPPED | OUTPATIENT
Start: 2019-01-27 | End: 2020-12-03
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
## 2019-01-27 NOTE — LETTER
[facility] [place] 
THE [facility] EMERGENCY DEPT 
[address] [address] 
[phone] Work/School Note Date: 1/27/2019 To Whom It May concern: 
 
Cielo Zepeda was seen and treated today in the emergency room by the following provider(s): 
Attending Provider: Tracy Hanks MD 
Physician Assistant: CATHERINE Philip. Cielo Zepeda may return to work on 2/4/19 Sincerely, 
 
 
 
 
CATHERINE Camacho
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
## 2020-08-04 ENCOUNTER — HOSPITAL ENCOUNTER (EMERGENCY)
Age: 80
Discharge: HOME | End: 2020-08-04
Attending: EMERGENCY MEDICINE | Admitting: EMERGENCY MEDICINE
Payer: MEDICARE

## 2020-08-04 ENCOUNTER — APPOINTMENT (OUTPATIENT)
Dept: GENERAL RADIOLOGY | Age: 80
End: 2020-08-04
Attending: EMERGENCY MEDICINE
Payer: MEDICARE

## 2020-08-04 ENCOUNTER — APPOINTMENT (OUTPATIENT)
Dept: CT IMAGING | Age: 80
End: 2020-08-04
Attending: EMERGENCY MEDICINE
Payer: MEDICARE

## 2020-08-04 VITALS
BODY MASS INDEX: 32.44 KG/M2 | HEART RATE: 66 BPM | WEIGHT: 190 LBS | SYSTOLIC BLOOD PRESSURE: 131 MMHG | TEMPERATURE: 98 F | RESPIRATION RATE: 16 BRPM | OXYGEN SATURATION: 96 % | HEIGHT: 64 IN | DIASTOLIC BLOOD PRESSURE: 78 MMHG

## 2020-08-04 DIAGNOSIS — J94.8 PLEURAL SCARRING: ICD-10-CM

## 2020-08-04 DIAGNOSIS — R07.9 CHEST PAIN, UNSPECIFIED TYPE: Primary | ICD-10-CM

## 2020-08-04 LAB
ALBUMIN SERPL-MCNC: 3.4 G/DL (ref 3.4–5)
ALBUMIN/GLOB SERPL: 1 {RATIO} (ref 0.8–1.7)
ALP SERPL-CCNC: 70 U/L (ref 45–117)
ALT SERPL-CCNC: 25 U/L (ref 16–61)
ANION GAP SERPL CALC-SCNC: 3 MMOL/L (ref 3–18)
AST SERPL-CCNC: 9 U/L (ref 10–38)
BASOPHILS # BLD: 0 K/UL (ref 0–0.1)
BASOPHILS NFR BLD: 0 % (ref 0–2)
BILIRUB SERPL-MCNC: 0.6 MG/DL (ref 0.2–1)
BNP SERPL-MCNC: 48 PG/ML (ref 0–1800)
BUN SERPL-MCNC: 13 MG/DL (ref 7–18)
BUN/CREAT SERPL: 16 (ref 12–20)
CALCIUM SERPL-MCNC: 8.9 MG/DL (ref 8.5–10.1)
CHLORIDE SERPL-SCNC: 105 MMOL/L (ref 100–111)
CO2 SERPL-SCNC: 31 MMOL/L (ref 21–32)
CREAT SERPL-MCNC: 0.83 MG/DL (ref 0.6–1.3)
DIFFERENTIAL METHOD BLD: NORMAL
EOSINOPHIL # BLD: 0.4 K/UL (ref 0–0.4)
EOSINOPHIL NFR BLD: 4 % (ref 0–5)
ERYTHROCYTE [DISTWIDTH] IN BLOOD BY AUTOMATED COUNT: 12.3 % (ref 11.6–14.5)
GLOBULIN SER CALC-MCNC: 3.5 G/DL (ref 2–4)
GLUCOSE SERPL-MCNC: 72 MG/DL (ref 74–99)
HCT VFR BLD AUTO: 45.4 % (ref 36–48)
HGB BLD-MCNC: 15.4 G/DL (ref 13–16)
LYMPHOCYTES # BLD: 2.6 K/UL (ref 0.9–3.6)
LYMPHOCYTES NFR BLD: 26 % (ref 21–52)
MAGNESIUM SERPL-MCNC: 1.9 MG/DL (ref 1.6–2.6)
MCH RBC QN AUTO: 32.5 PG (ref 24–34)
MCHC RBC AUTO-ENTMCNC: 33.9 G/DL (ref 31–37)
MCV RBC AUTO: 95.8 FL (ref 74–97)
MONOCYTES # BLD: 0.7 K/UL (ref 0.05–1.2)
MONOCYTES NFR BLD: 7 % (ref 3–10)
NEUTS SEG # BLD: 6.1 K/UL (ref 1.8–8)
NEUTS SEG NFR BLD: 63 % (ref 40–73)
PLATELET # BLD AUTO: 214 K/UL (ref 135–420)
PMV BLD AUTO: 9.7 FL (ref 9.2–11.8)
POTASSIUM SERPL-SCNC: 4.3 MMOL/L (ref 3.5–5.5)
PROT SERPL-MCNC: 6.9 G/DL (ref 6.4–8.2)
RBC # BLD AUTO: 4.74 M/UL (ref 4.7–5.5)
SODIUM SERPL-SCNC: 139 MMOL/L (ref 136–145)
TROPONIN I SERPL-MCNC: <0.02 NG/ML (ref 0–0.04)
WBC # BLD AUTO: 9.8 K/UL (ref 4.6–13.2)

## 2020-08-04 PROCEDURE — 71045 X-RAY EXAM CHEST 1 VIEW: CPT

## 2020-08-04 PROCEDURE — 83735 ASSAY OF MAGNESIUM: CPT

## 2020-08-04 PROCEDURE — 74011250636 HC RX REV CODE- 250/636: Performed by: EMERGENCY MEDICINE

## 2020-08-04 PROCEDURE — 85025 COMPLETE CBC W/AUTO DIFF WBC: CPT

## 2020-08-04 PROCEDURE — 74011636320 HC RX REV CODE- 636/320: Performed by: EMERGENCY MEDICINE

## 2020-08-04 PROCEDURE — 71275 CT ANGIOGRAPHY CHEST: CPT

## 2020-08-04 PROCEDURE — 80053 COMPREHEN METABOLIC PANEL: CPT

## 2020-08-04 PROCEDURE — 84484 ASSAY OF TROPONIN QUANT: CPT

## 2020-08-04 PROCEDURE — 74011250637 HC RX REV CODE- 250/637: Performed by: EMERGENCY MEDICINE

## 2020-08-04 PROCEDURE — 99283 EMERGENCY DEPT VISIT LOW MDM: CPT

## 2020-08-04 PROCEDURE — 74011000258 HC RX REV CODE- 258: Performed by: EMERGENCY MEDICINE

## 2020-08-04 PROCEDURE — 83880 ASSAY OF NATRIURETIC PEPTIDE: CPT

## 2020-08-04 PROCEDURE — 93005 ELECTROCARDIOGRAM TRACING: CPT

## 2020-08-04 RX ORDER — SODIUM CHLORIDE 9 MG/ML
100 INJECTION, SOLUTION INTRAVENOUS ONCE
Status: COMPLETED | OUTPATIENT
Start: 2020-08-04 | End: 2020-08-04

## 2020-08-04 RX ORDER — ASPIRIN 325 MG
325 TABLET ORAL
Status: COMPLETED | OUTPATIENT
Start: 2020-08-04 | End: 2020-08-04

## 2020-08-04 RX ADMIN — ASPIRIN 325 MG ORAL TABLET 325 MG: 325 PILL ORAL at 17:10

## 2020-08-04 RX ADMIN — IOPAMIDOL 75 ML: 755 INJECTION, SOLUTION INTRAVENOUS at 18:19

## 2020-08-04 RX ADMIN — SODIUM CHLORIDE 100 ML: 900 INJECTION, SOLUTION INTRAVENOUS at 18:20

## 2020-08-04 RX ADMIN — SODIUM CHLORIDE 1000 ML: 900 INJECTION, SOLUTION INTRAVENOUS at 17:46

## 2020-08-04 NOTE — ED PROVIDER NOTES
[address] [facility]  EMERGENCY DEPARTMENT HISTORY AND PHYSICAL EXAM       Date: 8/4/2020   Patient Name: Diana Cortes   YOB: 1940  Medical Record Number: [id number]    HISTORY OF PRESENTING ILLNESS:     Diana Cortes is a 70 y.o. male presenting with the noted PMH to the ED c/o intermittent left-sided chest pain. Patient states started 2 days ago. He states it feels like a pulling pain on the left side. He states is intermittent. With no increasing or decreasing factors. He states it lasts a few seconds. He states he never had that pain before. He denies any shortness of breath. Denies any nausea or vomiting. Denies any fevers or chills. Denies any cough or cold symptoms. Denies any abdominal pain. Denies any urine or bowel changes. He states he has had a stent placed about 5 years ago. Does not feel the same. He states he has history of sciatica. But no injuries or falls. He states he got appointment scheduled on the 18th for a possible injection in his back for sciatica. Rest of systems reviewed and negative. Primary Care Provider: Andre Lyle MD   Specialist:    Past Medical History:   Past Medical History:   Diagnosis Date    Asbestosis(501)     CAD (coronary artery disease)     (06/2014) Mid LAD (3.25 X 23 mm ) Xience ROSA M    CPAP (continuous positive airway pressure) dependence     Hemorrhoids     Hypercholesteremia     Hypertension     Sleep apnea     On CPAP    Vitamin D deficiency         Past Surgical History:   Past Surgical History:   Procedure Laterality Date    CHEST SURGERY PROCEDURE UNLISTED  1971    lobectomy left lung    HX APPENDECTOMY          Social History:   Social History     Tobacco Use    Smoking status: Former Smoker    Smokeless tobacco: Never Used   Substance Use Topics    Alcohol use: Yes     Alcohol/week: 1.7 standard drinks     Types: 2 Glasses of wine per week     Comment: 16 oz daily    Drug use:  No Allergies:   No Known Allergies     REVIEW OF SYSTEMS:  Review of Systems      PHYSICAL EXAM:  Vitals:    08/04/20 1641   BP: 131/78   Pulse: 66   Resp: 16   Temp: 98 °F (36.7 °C)   SpO2: 96%   Weight: 86.2 kg (190 lb)   Height: 5' 4\" (1.626 m)       Physical Exam   Vital signs reviewed. Alert oriented x 3 in NAD. Elderly and frail. HEENT: normocephalic atraumatic. Eyes are PERRLA EOMI. Conjunctiva normal.    External ears and nose normal.    Neck: normal external exam. No midline neck or back TTP. Lungs are clear to ascultation bilaterally. normal effort  Heart is regular rate and rhythm with no murmurs. Abdomen soft and nontender. No rebound rigidity or guarding. Extremities: Moves all 4 extremities and no distress. Full range of motion. 2+ pulses and BCR in all 4 extremities. Neuro: Normal gait with his cane. 5 out of 5 strength in all 4 extremities. No facial droop. Skin examination: intact. no rashes. No petechia or purpura. Medications   aspirin tablet 325 mg (325 mg Oral Given 8/4/20 1710)   sodium chloride 0.9 % bolus infusion 1,000 mL (1,000 mL IntraVENous New Bag 8/4/20 1746)   iopamidoL (ISOVUE-370) 76 % injection 75 mL (75 mL IntraVENous Given 8/4/20 1819)   0.9% sodium chloride infusion 100 mL (0 mL IntraVENous IV Completed 8/4/20 1821)       RESULTS:    Labs -   Labs Reviewed   METABOLIC PANEL, COMPREHENSIVE - Abnormal; Notable for the following components:       Result Value    Glucose 72 (*)     AST (SGOT) 9 (*)     All other components within normal limits   CBC WITH AUTOMATED DIFF   TROPONIN I   NT-PRO BNP   MAGNESIUM       Radiologic Studies -  No results found. EKG interpretation:   Done at 1646. Read by myself as normal sinus rhythm at 64 bpm.  No ST elevation. Nonspecific T waves. MEDICAL DECISION MAKING    1700. Patient reassessed. He states he has not felt any pain since being here. Updated partial results. Agrees to getting CT.    1840.   Patient reassessed. Still no pain here. Results and precaution explained. Initial CT read showing pleural scarring on the left. No pneumonia or new pneumothorax or PE seen. Official read pending tomorrow. Patient aware. Patient states he did have the lower part of his left lung removed 30 years ago and initially they thought it was for cancer but then later found out that there was a fungus there. We will follow-up with both cardiology and see his doctor Dr. Lali Eng as well as pulmonology to be rechecked. Patient to come back if symptoms change or worsen. Patient states understanding and agrees with plan. No evidence for ACS or STEMI. No pneumonia or pneumothorax. No PE. Abdomen soft and nontender. No signs or symptoms of acute appendicitis cholecystitis or pancreatitis. Results and precautions explained. Patient states understanding and agrees with plan. No signs of sepsis. Patient has no new complaints, changes, or physical findings. Results were reviewed with the patient. Pt's questions and concerns were addressed. Care plan was outlined, including follow-up with PCP/specialist and return precautions were discussed. Patient is felt to be stable for discharge at this time. Diagnosis   Clinical Impression:   1. Chest pain, unspecified type    2.  Pleural scarring           Follow-up Information     Follow up With Specialties Details Why Contact Info    Ania Sal MD Family Medicine Call in 1 day  [address] [facility]    [address] [facility]      Solo Castro MD Cardiology, Internal Medicine Call in 1 day  [address] [place] [address] [phone]      Deana Mcfadden MD Pulmonary Disease, Geriatric Medicine Call in 1 day  [address] [facility] [address] [place] [address] EMERGENCY DEPT Emergency Medicine Go in 2 days If symptoms worsen, As needed [address]  [phone]          Current Discharge Medication List          discharged in stable and improved condition. This chart was completed using Dragon, a dictation transcription service. Errors may have resulted from using this device.

## 2020-08-04 NOTE — DISCHARGE INSTRUCTIONS
Thank you so much for visiting us today. Please make sure to call your doctor, your heart doctor, and the lung doctor tomorrow to be rechecked in 1-3 days. Bring your results from here with you when you do. Return immediately if any fevers, headaches, chest pain, difficulty breathing, abdominal pain, worsening or changing symptoms, or any other concerns. Patient Education        Chest Pain: Care Instructions  Your Care Instructions     There are many things that can cause chest pain. Some are not serious and will get better on their own in a few days. But some kinds of chest pain need more testing and treatment. Your doctor may have recommended a follow-up visit in the next 8 to 12 hours. If you are not getting better, you may need more tests or treatment. Even though your doctor has released you, you still need to watch for any problems. The doctor carefully checked you, but sometimes problems can develop later. If you have new symptoms or if your symptoms do not get better, get medical care right away. If you have worse or different chest pain or pressure that lasts more than 5 minutes or you passed out (lost consciousness), [id number] or seek other emergency help right away. A medical visit is only one step in your treatment. Even if you feel better, you still need to do what your doctor recommends, such as going to all suggested follow-up appointments and taking medicines exactly as directed. This will help you recover and help prevent future problems. How can you care for yourself at home? · Rest until you feel better. · Take your medicine exactly as prescribed. Call your doctor if you think you are having a problem with your medicine. · Do not drive after taking a prescription pain medicine. When should you call for help? [id number]:   · You passed out (lost consciousness). · You have severe difficulty breathing. · You have symptoms of a heart attack. These may include:  ?  Chest pain or pressure, or a strange feeling in your chest.  ? Sweating. ? Shortness of breath. ? Nausea or vomiting. ? Pain, pressure, or a strange feeling in your back, neck, jaw, or upper belly or in one or both shoulders or arms. ? Lightheadedness or sudden weakness. ? A fast or irregular heartbeat. After you call 911, the  may tell you to chew 1 adult-strength or 2 to 4 low-dose aspirin. Wait for an ambulance. Do not try to drive yourself. Call your doctor today if:   · You have any trouble breathing. · Your chest pain gets worse. · You are dizzy or lightheaded, or you feel like you may faint. · You are not getting better as expected. · You are having new or different chest pain. Where can you learn more? Go to http://delmy-cuate.info/  Enter A120 in the search box to learn more about \"Chest Pain: Care Instructions. \"  Current as of: June 26, 2019               Content Version: 12.5  © 9357-1590 Healthwise, Incorporated. Care instructions adapted under license by [employer] (which disclaims liability or warranty for this information). If you have questions about a medical condition or this instruction, always ask your healthcare professional. [address] any warranty or liability for your use of this information.

## 2020-08-04 NOTE — ED TRIAGE NOTES
Over last 2 days has felt a heartbeat he has never experience before.  Described it as every so often 5 sec pulling sharp sensation across chest. Hx stent in past

## 2020-08-04 NOTE — ED NOTES
Pt has no c/o pain currently. Pt in no distress at time of dc. Pt agreeable to dc plan. I have reviewed discharge instructions with the patient. The patient verbalized understanding. Pt ambulatory to ed lobby to meet ride/wife.

## 2020-08-05 LAB
ATRIAL RATE: 64 BPM
CALCULATED P AXIS, ECG09: 37 DEGREES
CALCULATED R AXIS, ECG10: 14 DEGREES
CALCULATED T AXIS, ECG11: 37 DEGREES
DIAGNOSIS, 93000: NORMAL
P-R INTERVAL, ECG05: 162 MS
Q-T INTERVAL, ECG07: 384 MS
QRS DURATION, ECG06: 78 MS
QTC CALCULATION (BEZET), ECG08: 396 MS
VENTRICULAR RATE, ECG03: 64 BPM

## 2020-12-07 ENCOUNTER — OFFICE VISIT (OUTPATIENT)
Dept: CARDIOLOGY CLINIC | Age: 80
End: 2020-12-07
Payer: MEDICARE

## 2020-12-07 VITALS
OXYGEN SATURATION: 97 % | RESPIRATION RATE: 16 BRPM | HEART RATE: 66 BPM | TEMPERATURE: 97.3 F | BODY MASS INDEX: 30.59 KG/M2 | SYSTOLIC BLOOD PRESSURE: 127 MMHG | DIASTOLIC BLOOD PRESSURE: 64 MMHG | HEIGHT: 64 IN | WEIGHT: 179.2 LBS

## 2020-12-07 DIAGNOSIS — I25.10 CORONARY ARTERY DISEASE INVOLVING NATIVE CORONARY ARTERY OF NATIVE HEART WITHOUT ANGINA PECTORIS: ICD-10-CM

## 2020-12-07 DIAGNOSIS — R06.00 DYSPNEA, UNSPECIFIED TYPE: Primary | ICD-10-CM

## 2020-12-07 DIAGNOSIS — Z01.818 PRE-OP TESTING: ICD-10-CM

## 2020-12-07 PROCEDURE — 99214 OFFICE O/P EST MOD 30 MIN: CPT | Performed by: INTERNAL MEDICINE

## 2020-12-07 PROCEDURE — G8417 CALC BMI ABV UP PARAM F/U: HCPCS | Performed by: INTERNAL MEDICINE

## 2020-12-07 PROCEDURE — G8536 NO DOC ELDER MAL SCRN: HCPCS | Performed by: INTERNAL MEDICINE

## 2020-12-07 PROCEDURE — G8428 CUR MEDS NOT DOCUMENT: HCPCS | Performed by: INTERNAL MEDICINE

## 2020-12-07 PROCEDURE — G8432 DEP SCR NOT DOC, RNG: HCPCS | Performed by: INTERNAL MEDICINE

## 2020-12-07 NOTE — PATIENT INSTRUCTIONS
Testing   Echo/ Nuclear Stress  Please call [facility] scheduling at [phone]  to schedule an appointment.    All testing is completed at [address] [facility], [facility]    **call office 3-5 days after testing is completed for results**

## 2020-12-07 NOTE — PROGRESS NOTES
[facility]    Mr. Wallace Napoles is a 68 y.o. male with history of coronary artery disease s/p LAD stent (06/2014), sleep apnea, hypertension, hyperlipidemia and obesity. Mr. Wallace Napoles is here today for follow up appointment. Patient has been having some right lower extremity pain. Patient was seen by spine specialist and was diagnosed with lumbar region intervertebral disc displacement for which patient will need laminectomy and discectomy. He was asked to come see me. Patient tells me that he does not have any resting chest pain or chest tightness. He is functionally very limited because of his back and thigh pain. He cannot walk more than 100 feet without having to stop because of this. He cannot really climb any flight of stairs. He denies PND  Taking all medications regularly. Admit that taking lisinopril full dose. Denies any nausea, vomiting, abdominal pain, fever, chills, sputum production. No hematuria or other bleeding complaints    Past Medical History:   Diagnosis Date    Asbestosis(501)     CAD (coronary artery disease)     (06/2014) Mid LAD (3.25 X 23 mm ) Xience ROSA M    CPAP (continuous positive airway pressure) dependence     Diabetes ([facility] [address]     Diverticulitis     GERD (gastroesophageal reflux disease)     Hemorrhoids     Hypercholesteremia     Hypertension     Ill-defined condition     constant pain- back ,rt leg    Sleep apnea     On CPAP    Vitamin D deficiency        Past Surgical History:   Procedure Laterality Date    CARDIAC SURG PROCEDURE UNLIST  2014    heart stent    CHEST SURGERY PROCEDURE UNLISTED  1971    lobectomy left lung    HX APPENDECTOMY      HX ORTHOPAEDIC  2017    HIP REPLCEMENT    HX ORTHOPAEDIC      REJUVINIX- KNNE       Current Outpatient Medications   Medication Sig    isosorbide mononitrate ER (IMDUR) 30 mg tablet Take 20 mg by mouth daily.     metFORMIN (GLUMETZA) 1,000 mg TG24 24 hour tablet Take  by mouth daily.  glimepiride (AMARYL) 4 mg tablet Take  by mouth every evening.  Calcium-Cholecalciferol, D3, (Calcium 600 with Vitamin D3) 600 mg(1,500mg) -400 unit chew Take  by mouth daily.  glucosam/chond/hyalu/CF borate (MOVE FREE JOINT HEALTH PO) Take 1 Tab by mouth daily.  gabapentin (NEURONTIN) 300 mg capsule Take 300 mg by mouth three (3) times daily.  metFORMIN (GLUCOPHAGE) 500 mg tablet every evening.  atorvastatin (LIPITOR) 40 mg tablet Take 1 Tab by mouth daily.  carvedilol (COREG) 3.125 mg tablet TAKE 1 TABLET TWICE A DAY WITH MEALS    nitroglycerin (NITROSTAT) 0.4 mg SL tablet 1 Tab by SubLINGual route as needed for Chest Pain.  ergocalciferol (ERGOCALCIFEROL) 50,000 unit capsule Take 50,000 Units by mouth every thirty (30) days. Twice a month    omega-3 fatty acids-vitamin e (FISH OIL) 1,000 mg cap Take 1 Cap by mouth daily.  multivitamin (ONE A DAY) tablet Take 1 Tab by mouth daily.  ibuprofen 100 mg tablet Take 200 mg by mouth every four (4) hours as needed for Pain.  aspirin 81 mg tablet Take 162 mg by mouth daily. PT STATES INSTRUCTED BY DR. Marcial Velasquez TO HOLD 10 DAYS BEFORE SURGERY     No current facility-administered medications for this visit. Allergies and Sensitivities:  No Known Allergies    Family History:  Family History   Problem Relation Age of Onset    Heart Attack Father        Social History:  Social History     Tobacco Use    Smoking status: Former Smoker     Packs/day: 1.00    Smokeless tobacco: Never Used    Tobacco comment: QUIT 1997; SMOKED X 20 YRS OFF AND ON   Substance Use Topics    Alcohol use: Not Currently    Drug use: No     He  reports that he has quit smoking. He smoked 1.00 pack per day. He has never used smokeless tobacco.  He  reports previous alcohol use. Review of Systems:  Cardiac symptoms as noted above in HPI. All others negative.   Denies fatigue, malaise, skin rash, joint pain, blurring vision, photophobia, neck pain, hemoptysis, chronic cough, nausea, vomiting, burning micturition, BRBPR, chronic headaches. Physical Exam:  BP Readings from Last 3 Encounters:   12/07/20 127/64   08/04/20 131/78   01/27/19 152/75         Pulse Readings from Last 3 Encounters:   12/07/20 66   08/04/20 66   01/27/19 82          Wt Readings from Last 3 Encounters:   12/07/20 179 lb 3.2 oz (81.3 kg)   12/03/20 180 lb (81.6 kg)   08/04/20 190 lb (86.2 kg)       Constitutional: Oriented to person, place, and time. HENT: Head: Normocephalic and atraumatic. Neck: No JVD present. Cardiovascular: Regular rhythm. No murmur, gallop or rubs appreciated  Lung: Breath sounds normal. No respiratory distress. No ronchi or rales appreciated  Abdominal: No tenderness. No rebound and no guarding. Musculoskeletal: There is no lower extremity edema. Review of Data  LABS:   Lab Results   Component Value Date/Time    Sodium 138 12/03/2020 11:05 AM    Potassium 4.0 12/03/2020 11:05 AM    Chloride 103 12/03/2020 11:05 AM    CO2 31 12/03/2020 11:05 AM    Glucose 100 12/03/2020 11:05 AM    BUN 17 12/03/2020 11:05 AM    Creatinine 0.8 12/03/2020 11:05 AM     No flowsheet data found. Lab Results   Component Value Date/Time    ALT (SGPT) 25 08/04/2020 04:50 PM     No results found for: HBA1C, [id number]    EKG  (07/2014) Sinus rhythm at 73 bpm. No ST-t changes of ischemia. Normal CO and QRS. Normal axis    ECHO (09/16)  SUMMARY:  Left ventricle: Systolic function was at the lower limits of normal.  Ejection fraction was estimated to be 55 %. There were no regional wall motion abnormalities. Doppler parameters were consistent with abnormal left ventricular relaxation (grade 1 diastolic dysfunction). Right ventricle: Systolic function was normal.  Mitral valve: There was no evidence for stenosis. There was trivial regurgitation. Aortic valve: There was no stenosis. There was trivial regurgitation. Tricuspid valve:  There was trivial regurgitation. STRESS TEST (06/2014)  1. Pharmacologic nuclear stress test using Lexiscan. 2. No EKG evidence of ischemia during Lexiscan infusion. 3. Medium to large area of reversible defect which is moderate in intensity and involving mid to distal anterior and anteroseptal wall involving apex, suggesting ischemia. No convincing evidence of infarct. 4. Gated images showed calculated ejection fraction of 69% without any obvious wall motion abnormality. CATHETERIZATION (06/2014)  LVGram:(manual injection)   EF: 55% without significant wall motion abnormalities   Mitral Regurgitation: No significant   No significant gradient across the aortic valve   LVEDP ~12-14 mm hg   Coronary angiography:   Dominance: Right   LM: Angiographically normal   LAD: Mid tubular 95% stenosis otherwise normal, Large D1: prox 20% luminal irregularities otherwise normal   LCX: /OM: Angiographically normal   RCA: Dominant, 10-20% prox luminal irregularities otherwise Angiographically normal   PCI Detail for Mid LAD stenosis   Pre-dilation 2.5 X 15 mm TREK , 3.25 X 23 mm Xience ROSA M , Post-dilation 3.5 X 15 mm TREK NC baloon @ 14-16 génesis   Stenosis from 95% --> 0 %     STRESS TEST(09/16)  SUMMARY/IMPRESSION  1. Pharmacologic nuclear stress test using Lexiscan. 2. No EKG evidence of ischemia during Lexiscan infusion. 3. No convincing evidence of reversible defect to suggest ongoing major ischemia. 4. Diaphragmatic attenuation artifact noted over inferior border of the heart. Very small fixed apical defect, which is most likely from apical thinning versus artifact. 5. Calculated ejection fraction of 71% without any obvious wall motion abnormality. End-diastolic volume of 66 mL.     IMPRESSION & PLAN:  Carisa Carrion is a 68 y.o. male with coronary artery disease, hypertension, hyperlipidemia and obesity and sleep apnea. Coronary artery disease:  He had an LAD stent in 2014.   Nuclear stress and echo in 09/16 without any RWMA and ischemia. Continue BB, imdur, ASA and statin  Continue medical management. Hypertension: /64. Currently on Imdur, Coreg. Hyperlipidemia:  He is on atorvastatin 40 mg daily. Continue same    Preoperative evaluation:  Patient is scheduled for lumbar region discectomy and laminectomy. Unfortunately patient's limited because of his significant back and leg pain. Functional status cannot be based because of limited activity because of back pain  Will proceed with nuclear stress test for recertification and echocardiogram to rule out LV dysfunction before proceeding with planned elective surgery      This plan was discussed with patient who is in agreement. Thank you for allowing me to participate in patient care. Please feel free to call me if you have any question or concern.

## 2020-12-09 ENCOUNTER — HOSPITAL ENCOUNTER (OUTPATIENT)
Dept: NON INVASIVE DIAGNOSTICS | Age: 80
Discharge: HOME | End: 2020-12-09
Attending: INTERNAL MEDICINE
Payer: MEDICARE

## 2020-12-09 ENCOUNTER — HOSPITAL ENCOUNTER (OUTPATIENT)
Dept: NUCLEAR MEDICINE | Age: 80
Discharge: HOME | End: 2020-12-09
Attending: INTERNAL MEDICINE
Payer: MEDICARE

## 2020-12-09 VITALS
HEIGHT: 64 IN | WEIGHT: 179 LBS | BODY MASS INDEX: 30.56 KG/M2 | SYSTOLIC BLOOD PRESSURE: 146 MMHG | DIASTOLIC BLOOD PRESSURE: 63 MMHG

## 2020-12-09 DIAGNOSIS — R06.00 DYSPNEA, UNSPECIFIED TYPE: ICD-10-CM

## 2020-12-09 DIAGNOSIS — Z01.818 PRE-OP TESTING: ICD-10-CM

## 2020-12-09 DIAGNOSIS — I25.10 CORONARY ARTERY DISEASE INVOLVING NATIVE CORONARY ARTERY OF NATIVE HEART WITHOUT ANGINA PECTORIS: ICD-10-CM

## 2020-12-09 PROCEDURE — 93017 CV STRESS TEST TRACING ONLY: CPT

## 2020-12-09 PROCEDURE — A9500 TC99M SESTAMIBI: HCPCS

## 2020-12-09 PROCEDURE — 74011250636 HC RX REV CODE- 250/636: Performed by: INTERNAL MEDICINE

## 2020-12-09 RX ADMIN — REGADENOSON 0.4 MG: 0.08 INJECTION, SOLUTION INTRAVENOUS at 08:30

## 2020-12-10 LAB
STRESS BASELINE HR: 68 BPM
STRESS ESTIMATED WORKLOAD: 1 METS
STRESS EXERCISE DUR MIN: NORMAL
STRESS PEAK DIAS BP: 76 MMHG
STRESS PEAK SYS BP: 160 MMHG
STRESS PERCENT HR ACHIEVED: 65 %
STRESS POST PEAK HR: 93 BPM
STRESS RATE PRESSURE PRODUCT: NORMAL BPM*MMHG
STRESS ST DEPRESSION: 0 MM
STRESS ST ELEVATION: 0 MM
STRESS TARGET HR: 142 BPM

## 2020-12-11 ENCOUNTER — TELEPHONE (OUTPATIENT)
Dept: CARDIOLOGY CLINIC | Age: 80
End: 2020-12-11

## 2020-12-11 NOTE — TELEPHONE ENCOUNTER
Vicki Benton from Dr Ambrosio Rayo (neurosurgery) office is calling to ask if patient is cleared for back surgery on 12/22/20. Will review with Dr. Janet Mosher when he returns to the office and follow up with surgeon's office.

## 2020-12-11 NOTE — LETTER
12/14/2020 Mr. Nessa Bernard [address] [facility] [phone] To whom it may concern: 
 
Nessa Bernard was seen in our office on December 7, 2020 for cardiac evaluation. From a cardiac standpoint he is low risk for upcoming RIGHT HEMILAMINECTOMY L2-L4; DISCECTOMY (Right), but should remain on his aspirin 81 mg. Please feel free to contact our office if you have any questions regarding this patient. Sincerely, Johanny Wheat MD

## 2020-12-14 DIAGNOSIS — Z01.818 PRE-OP TESTING: ICD-10-CM

## 2020-12-14 DIAGNOSIS — R06.00 DYSPNEA, UNSPECIFIED TYPE: Primary | ICD-10-CM

## 2020-12-14 DIAGNOSIS — R06.00 DYSPNEA, UNSPECIFIED TYPE: ICD-10-CM

## 2020-12-14 NOTE — PROGRESS NOTES
Patient is scheduled for lumbar region discectomy and laminectomy. Unfortunately patient's limited because of his significant back and leg pain. Functional status cannot be based because of limited activity because of back pain  Will proceed with nuclear stress test for recertification and echocardiogram to rule out LV dysfunction before proceeding with planned elective surgery        This plan was discussed with patient who is in agreement.

## 2020-12-14 NOTE — TELEPHONE ENCOUNTER
Incoming from pt. Two patient Identifiers confirmed. Advised pt per Dr Talha Watters he needed to complete and echo and nuclear stress for sx. Pt verbalized understanding and given number for scheduling.

## 2020-12-18 ENCOUNTER — TELEPHONE (OUTPATIENT)
Dept: CARDIOLOGY CLINIC | Age: 80
End: 2020-12-18

## 2020-12-18 NOTE — TELEPHONE ENCOUNTER
Justino Menard from Dr Eldon Garg (neurosurgery) office contacted the office requesting the pre-op clearance paperwork. Paperwork was faxed to [phone]: Pre-op. Confirmation received. Encounter closed.

## 2020-12-22 PROBLEM — M51.16 LUMBAR DISC HERNIATION WITH RADICULOPATHY: Status: ACTIVE | Noted: 2020-12-22

## 2021-04-27 ENCOUNTER — OFFICE VISIT (OUTPATIENT)
Dept: CARDIOLOGY CLINIC | Age: 81
End: 2021-04-27
Payer: MEDICARE

## 2021-04-27 VITALS
BODY MASS INDEX: 30.56 KG/M2 | WEIGHT: 179 LBS | DIASTOLIC BLOOD PRESSURE: 59 MMHG | HEART RATE: 98 BPM | OXYGEN SATURATION: 97 % | HEIGHT: 64 IN | TEMPERATURE: 97.5 F | SYSTOLIC BLOOD PRESSURE: 120 MMHG | RESPIRATION RATE: 16 BRPM

## 2021-04-27 DIAGNOSIS — I25.10 CORONARY ARTERY DISEASE WITHOUT ANGINA PECTORIS, UNSPECIFIED VESSEL OR LESION TYPE, UNSPECIFIED WHETHER NATIVE OR TRANSPLANTED HEART: ICD-10-CM

## 2021-04-27 DIAGNOSIS — I27.20 PULMONARY HTN (HCC): ICD-10-CM

## 2021-04-27 DIAGNOSIS — I25.10 CORONARY ARTERY DISEASE INVOLVING NATIVE CORONARY ARTERY OF NATIVE HEART WITHOUT ANGINA PECTORIS: Primary | ICD-10-CM

## 2021-04-27 PROCEDURE — G8428 CUR MEDS NOT DOCUMENT: HCPCS | Performed by: INTERNAL MEDICINE

## 2021-04-27 PROCEDURE — G8510 SCR DEP NEG, NO PLAN REQD: HCPCS | Performed by: INTERNAL MEDICINE

## 2021-04-27 PROCEDURE — G8536 NO DOC ELDER MAL SCRN: HCPCS | Performed by: INTERNAL MEDICINE

## 2021-04-27 PROCEDURE — 99214 OFFICE O/P EST MOD 30 MIN: CPT | Performed by: INTERNAL MEDICINE

## 2021-04-27 PROCEDURE — 1101F PT FALLS ASSESS-DOCD LE1/YR: CPT | Performed by: INTERNAL MEDICINE

## 2021-04-27 PROCEDURE — G8417 CALC BMI ABV UP PARAM F/U: HCPCS | Performed by: INTERNAL MEDICINE

## 2021-04-27 RX ORDER — NITROGLYCERIN 0.4 MG/1
0.4 TABLET SUBLINGUAL AS NEEDED
Qty: 1 BOTTLE | Refills: 5 | Status: SHIPPED | OUTPATIENT
Start: 2021-04-27 | End: 2022-08-31

## 2021-04-27 NOTE — PROGRESS NOTES
Judith Foss presents today for   Chief Complaint   Patient presents with    Follow-up     surgery       Judith Foss preferred language for health care discussion is english/other. Personal Protective Equipment:   Personal Protective Equipment was used including: mask-surgical and hands-gloves. Patient was placed on no precaution(s). Patient was masked. Precautions:   Patient currently on None  Patient currently roomed with door closed    Is someone accompanying this pt? yes    Is the patient using any DME equipment during [address]? no    Depression Screening:  3 most recent PHQ Screens 4/27/2021   Little interest or pleasure in doing things Not at all   Feeling down, depressed, irritable, or hopeless Not at all   Total Score PHQ 2 0       Learning Assessment:  Learning Assessment 6/6/2017   PRIMARY LEARNER Patient   PRIMARY LANGUAGE ENGLISH   LEARNER PREFERENCE PRIMARY DEMONSTRATION   ANSWERED BY Patient   RELATIONSHIP SELF       Abuse Screening:  No flowsheet data found. Fall Risk  Fall Risk Assessment, last 12 mths 4/27/2021   Able to walk? Yes   Fall in past 12 months? 0   Do you feel unsteady? 0   Are you worried about falling 0       Pt currently taking Anticoagulant therapy? asa    Coordination of Care:  1. Have you been to the ER, urgent care clinic since your last visit? Hospitalized since your last visit? no    2. Have you seen or consulted any other health care providers outside of the [address] [place] [address] since your last visit? Include any pap smears or colon screening.  no

## 2021-04-27 NOTE — LETTER
4/27/2021 Patient: Jackie Forbes YOB: 1940 Date of Visit: 4/27/2021 Brea Herrera MD 
[address] [facility] [address] [address] Fax: [phone] Dear Brea Herrera MD, Thank you for referring Mr. Zachary Ford to [address] [place] [address] for evaluation. My notes for this consultation are attached. If you have questions, please do not hesitate to call me. I look forward to following your patient along with you. Sincerely, Akhil Anderson MD

## 2021-04-27 NOTE — PROGRESS NOTES
[facility]    Mr. Tiffany Morris is a 68 y.o. male with history of coronary artery disease s/p LAD stent (06/2014), sleep apnea, hypertension, hyperlipidemia and obesity. Patient is here today for follow-up appointment. Patient had to go to emergency department recently with episode of chest pain. It was thought to be musculoskeletal in nature. Patient was given lidocaine patch. His cardiac biomarker was unremarkable and EKG was unremarkable according to report  Patient feels okay now. Occasionally he continues to have sternal pain. It gets worse when he is pressing on his sternal bone. He denies any shortness of breath. He denies any chest pain or chest tightness reminiscent of his angina from before  He is taking his medications regularly. He is functionally limited because of his significant back pain  Denies any nausea, vomiting, abdominal pain, fever, chills, sputum production. No hematuria or other bleeding complaints    Past Medical History:   Diagnosis Date    Asbestosis(501)     CAD (coronary artery disease)     (06/2014) Mid LAD (3.25 X 23 mm ) Xience ROSA M    CPAP (continuous positive airway pressure) dependence     Diabetes ([address]     Diverticulitis     GERD (gastroesophageal reflux disease)     Hemorrhoids     Hypercholesteremia     Hypertension     Ill-defined condition     constant pain- back ,rt leg    Sleep apnea     On CPAP    Vitamin D deficiency        Past Surgical History:   Procedure Laterality Date    HX APPENDECTOMY      HX ORTHOPAEDIC  2017    HIP REPLCEMENT    HX ORTHOPAEDIC      REJUVINIX- KNNE    MA CARDIAC SURG PROCEDURE UNLIST  2014    heart stent    MA CHEST SURGERY PROCEDURE UNLISTED  1971    lobectomy left lung       Current Outpatient Medications   Medication Sig    isosorbide mononitrate ER (IMDUR) 30 mg tablet Take 20 mg by mouth daily.     metFORMIN (GLUMETZA) 1,000 mg TG24 24 hour tablet Take  by mouth daily.  glimepiride (AMARYL) 4 mg tablet Take  by mouth every evening.  ibuprofen 100 mg tablet Take 200 mg by mouth every four (4) hours as needed for Pain.  Calcium-Cholecalciferol, D3, (Calcium 600 with Vitamin D3) 600 mg(1,500mg) -400 unit chew Take  by mouth daily.  glucosam/chond/hyalu/CF borate (MOVE FREE JOINT HEALTH PO) Take 1 Tab by mouth daily.  gabapentin (NEURONTIN) 300 mg capsule Take 300 mg by mouth three (3) times daily.  metFORMIN (GLUCOPHAGE) 500 mg tablet every evening.  atorvastatin (LIPITOR) 40 mg tablet Take 1 Tab by mouth daily.  carvedilol (COREG) 3.125 mg tablet TAKE 1 TABLET TWICE A DAY WITH MEALS    nitroglycerin (NITROSTAT) 0.4 mg SL tablet 1 Tab by SubLINGual route as needed for Chest Pain.  aspirin 81 mg tablet Take 162 mg by mouth daily. PT STATES INSTRUCTED BY DR. Damaris Dale TO HOLD 10 DAYS BEFORE SURGERY    ergocalciferol (ERGOCALCIFEROL) 50,000 unit capsule Take 50,000 Units by mouth every thirty (30) days. Twice a month    omega-3 fatty acids-vitamin e (FISH OIL) 1,000 mg cap Take 1 Cap by mouth daily.  multivitamin (ONE A DAY) tablet Take 1 Tab by mouth daily. No current facility-administered medications for this visit. Allergies and Sensitivities:  No Known Allergies    Family History:  Family History   Problem Relation Age of Onset    Heart Attack Father        Social History:  Social History     Tobacco Use    Smoking status: Former Smoker     Packs/day: 1.00     Years: 20.00     Pack years: 20.00    Smokeless tobacco: Never Used    Tobacco comment: QUIT 1997; SMOKED X 20 YRS OFF AND ON   Substance Use Topics    Alcohol use: Not Currently    Drug use: No     He  reports that he has quit smoking. He has a 20.00 pack-year smoking history. He has never used smokeless tobacco.  He  reports previous alcohol use. Review of Systems:  Cardiac symptoms as noted above in HPI. All others negative.   Denies blurring vision, photophobia, neck pain, hemoptysis, chronic cough, nausea, vomiting, burning micturition, BRBPR, chronic headaches. Physical Exam:  BP Readings from Last 3 Encounters:   04/27/21 (!) 120/59   12/23/20 (!) 141/62   12/09/20 (!) 146/63         Pulse Readings from Last 3 Encounters:   04/27/21 98   12/23/20 75   12/07/20 66          Wt Readings from Last 3 Encounters:   04/27/21 179 lb (81.2 kg)   12/22/20 178 lb 12.7 oz (81.1 kg)   12/09/20 179 lb (81.2 kg)       Constitutional: Oriented to person, place, and time. HENT: Head: Normocephalic and atraumatic. Neck: No JVD present. Cardiovascular: Regular rhythm. No murmur, gallop or rubs appreciated  Lung: Breath sounds normal. No respiratory distress. No ronchi or rales appreciated  Abdominal: No tenderness. No rebound and no guarding. Musculoskeletal: There is trace lower extremity edema. Review of Data  LABS:   Lab Results   Component Value Date/Time    Sodium 138 12/03/2020 11:05 AM    Potassium 4.0 12/03/2020 11:05 AM    Chloride 103 12/03/2020 11:05 AM    CO2 31 12/03/2020 11:05 AM    Glucose 100 12/03/2020 11:05 AM    BUN 17 12/03/2020 11:05 AM    Creatinine 0.8 12/03/2020 11:05 AM     No flowsheet data found. Lab Results   Component Value Date/Time    ALT (SGPT) 25 08/04/2020 04:50 PM     No results found for: HBA1C, [id number]    EKG  (07/2014) Sinus rhythm at 73 bpm. No ST-t changes of ischemia. Normal MD and QRS. Normal axis    ECHO (09/16)  SUMMARY:  Left ventricle: Systolic function was at the lower limits of normal.  Ejection fraction was estimated to be 55 %. There were no regional wall motion abnormalities. Doppler parameters were consistent with abnormal left ventricular relaxation (grade 1 diastolic dysfunction). Right ventricle: Systolic function was normal.  Mitral valve: There was no evidence for stenosis. There was trivial regurgitation. Aortic valve: There was no stenosis. There was trivial regurgitation.    Tricuspid valve: There was trivial regurgitation. STRESS TEST (06/2014)  1. Pharmacologic nuclear stress test using Lexiscan. 2. No EKG evidence of ischemia during Lexiscan infusion. 3. Medium to large area of reversible defect which is moderate in intensity and involving mid to distal anterior and anteroseptal wall involving apex, suggesting ischemia. No convincing evidence of infarct. 4. Gated images showed calculated ejection fraction of 69% without any obvious wall motion abnormality. CATHETERIZATION (06/2014)  LVGram:(manual injection)   EF: 55% without significant wall motion abnormalities   Mitral Regurgitation: No significant   No significant gradient across the aortic valve   LVEDP ~12-14 mm hg   Coronary angiography:   Dominance: Right   LM: Angiographically normal   LAD: Mid tubular 95% stenosis otherwise normal, Large D1: prox 20% luminal irregularities otherwise normal   LCX: /OM: Angiographically normal   RCA: Dominant, 10-20% prox luminal irregularities otherwise Angiographically normal   PCI Detail for Mid LAD stenosis   Pre-dilation 2.5 X 15 mm TREK , 3.25 X 23 mm Xience ROSA M , Post-dilation 3.5 X 15 mm TREK NC baloon @ 14-16 génesis   Stenosis from 95% --> 0 %     STRESS TEST(09/16)  SUMMARY/IMPRESSION  1. Pharmacologic nuclear stress test using Lexiscan. 2. No EKG evidence of ischemia during Lexiscan infusion. 3. No convincing evidence of reversible defect to suggest ongoing major ischemia. 4. Diaphragmatic attenuation artifact noted over inferior border of the heart. Very small fixed apical defect, which is most likely from apical thinning versus artifact. 5. Calculated ejection fraction of 71% without any obvious wall motion abnormality. End-diastolic volume of 66 mL.     IMPRESSION & PLAN:  Rashida Rodriguez is a 68 y.o. male with coronary artery disease, hypertension, hyperlipidemia and obesity and sleep apnea. Coronary artery disease:  He had an LAD stent in 2014.   Nuclear stress and echo in 09/16 without any RWMA and ischemia. Continue BB, imdur, ASA and statin  Continue medical management. Reproducible chest pain on sternal bone is likely musculoskeletal in nature. Advised patient to take 1 week course of over-the-counter Advil. Reassured patient that this pain does not appear to be cardiac in nature. Hypertension: /59. Currently on Imdur, Coreg. Hyperlipidemia:  He is on atorvastatin 40 mg daily. Continue same    Patient has been having some abdominal fullness, occasional swelling and some chest discomfort somewhat atypical for angina. He also has sleep apnea. I would recommend that he get echocardiogram to make sure he does not have any pulmonary hypertension or LV dysfunction. Would like to make sure he does not have any regional wall motion abnormality. This plan was discussed with patient who is in agreement. Thank you for allowing me to participate in patient care. Please feel free to call me if you have any question or concern.

## 2021-07-20 ENCOUNTER — OFFICE VISIT (OUTPATIENT)
Dept: CARDIOLOGY CLINIC | Age: 81
End: 2021-07-20
Payer: MEDICARE

## 2021-07-20 VITALS
OXYGEN SATURATION: 97 % | BODY MASS INDEX: 33.63 KG/M2 | SYSTOLIC BLOOD PRESSURE: 108 MMHG | HEART RATE: 68 BPM | WEIGHT: 197 LBS | DIASTOLIC BLOOD PRESSURE: 61 MMHG | HEIGHT: 64 IN

## 2021-07-20 DIAGNOSIS — I27.20 PULMONARY HTN (HCC): ICD-10-CM

## 2021-07-20 DIAGNOSIS — I25.10 CORONARY ARTERY DISEASE INVOLVING NATIVE CORONARY ARTERY OF NATIVE HEART WITHOUT ANGINA PECTORIS: Primary | ICD-10-CM

## 2021-07-20 PROCEDURE — G8417 CALC BMI ABV UP PARAM F/U: HCPCS | Performed by: INTERNAL MEDICINE

## 2021-07-20 PROCEDURE — G8510 SCR DEP NEG, NO PLAN REQD: HCPCS | Performed by: INTERNAL MEDICINE

## 2021-07-20 PROCEDURE — 99213 OFFICE O/P EST LOW 20 MIN: CPT | Performed by: INTERNAL MEDICINE

## 2021-07-20 PROCEDURE — 1101F PT FALLS ASSESS-DOCD LE1/YR: CPT | Performed by: INTERNAL MEDICINE

## 2021-07-20 PROCEDURE — G8428 CUR MEDS NOT DOCUMENT: HCPCS | Performed by: INTERNAL MEDICINE

## 2021-07-20 PROCEDURE — G8536 NO DOC ELDER MAL SCRN: HCPCS | Performed by: INTERNAL MEDICINE

## 2021-07-20 NOTE — PROGRESS NOTES
Emeli Hair presents today for   Chief Complaint   Patient presents with    Follow-up     3 month follow up        Emeli Hair preferred language for health care discussion is english/other. Is someone accompanying this pt? no    Is the patient using any DME equipment during [address]? no    Depression Screening:  3 most recent PHQ Screens 7/20/2021   Little interest or pleasure in doing things Not at all   Feeling down, depressed, irritable, or hopeless Not at all   Total Score PHQ 2 0       Learning Assessment:  Learning Assessment 6/6/2017   PRIMARY LEARNER Patient   PRIMARY LANGUAGE ENGLISH   LEARNER PREFERENCE PRIMARY DEMONSTRATION   ANSWERED BY Patient   RELATIONSHIP SELF       Abuse Screening:  No flowsheet data found. Fall Risk  Fall Risk Assessment, last 12 mths 7/20/2021   Able to walk? Yes   Fall in past 12 months? 0   Do you feel unsteady? 0   Are you worried about falling 0       Pt currently taking Anticoagulant therapy? ASA 81mg every day     Coordination of Care:  1. Have you been to the ER, urgent care clinic since your last visit? Hospitalized since your last visit? no    2. Have you seen or consulted any other health care providers outside of the [address] [place] [address] since your last visit? Include any pap smears or colon screening.  no

## 2021-07-20 NOTE — PROGRESS NOTES
[facility]    Mr. Cuca Mercado is a 68 y.o. male with history of coronary artery disease s/p LAD stent (06/2014), sleep apnea, hypertension, hyperlipidemia and obesity. Patient is here today for follow-up appointment. He denies any hospital admission or ER visits since last time. He denies any use of nitroglycerin. Denies any palpitation, presyncope or syncope. Continues to have quite limiting back pain. Denies any nausea, vomiting, abdominal pain, fever, chills, sputum production. No hematuria or other bleeding complaints    Past Medical History:   Diagnosis Date    Asbestosis(501)     CAD (coronary artery disease)     (06/2014) Mid LAD (3.25 X 23 mm ) Xience ROSA M    CPAP (continuous positive airway pressure) dependence     Diabetes ([address]     Diverticulitis     GERD (gastroesophageal reflux disease)     Hemorrhoids     Hypercholesteremia     Hypertension     Ill-defined condition     constant pain- back ,rt leg    Sleep apnea     On CPAP    Vitamin D deficiency        Past Surgical History:   Procedure Laterality Date    HX APPENDECTOMY      HX ORTHOPAEDIC  2017    HIP REPLCEMENT    HX ORTHOPAEDIC      REJUVINIX- KNNE    HI CARDIAC SURG PROCEDURE UNLIST  2014    heart stent    HI CHEST SURGERY PROCEDURE UNLISTED  1971    lobectomy left lung       Current Outpatient Medications   Medication Sig    nitroglycerin (NITROSTAT) 0.4 mg SL tablet 1 Tab by SubLINGual route as needed for Chest Pain.  isosorbide mononitrate ER (IMDUR) 30 mg tablet Take 30 mg by mouth daily.  metFORMIN (GLUMETZA) 1,000 mg TG24 24 hour tablet Take  by mouth daily.  glimepiride (AMARYL) 4 mg tablet Take  by mouth every evening.  ibuprofen 100 mg tablet Take 200 mg by mouth every four (4) hours as needed for Pain.  Calcium-Cholecalciferol, D3, (Calcium 600 with Vitamin D3) 600 mg(1,500mg) -400 unit chew Take  by mouth daily.     glucosam/chond/hyalu/CF borate (MOVE FREE JOINT HEALTH PO) Take 1 Tab by mouth daily.  gabapentin (NEURONTIN) 300 mg capsule Take 300 mg by mouth three (3) times daily.  metFORMIN (GLUCOPHAGE) 500 mg tablet every evening.  atorvastatin (LIPITOR) 40 mg tablet Take 1 Tab by mouth daily.  carvedilol (COREG) 3.125 mg tablet TAKE 1 TABLET TWICE A DAY WITH MEALS    aspirin 81 mg tablet Take 162 mg by mouth daily. PT STATES INSTRUCTED BY DR. Gisell Steele TO HOLD 10 DAYS BEFORE SURGERY    ergocalciferol (ERGOCALCIFEROL) 50,000 unit capsule Take 50,000 Units by mouth every thirty (30) days. Twice a month    omega-3 fatty acids-vitamin e (FISH OIL) 1,000 mg cap Take 1 Cap by mouth daily.  multivitamin (ONE A DAY) tablet Take 1 Tab by mouth daily. No current facility-administered medications for this visit. Allergies and Sensitivities:  No Known Allergies    Family History:  Family History   Problem Relation Age of Onset    Heart Attack Father        Social History:  Social History     Tobacco Use    Smoking status: Former Smoker     Packs/day: 1.00     Years: 20.00     Pack years: 20.00    Smokeless tobacco: Never Used    Tobacco comment: QUIT 1997; SMOKED X 20 YRS OFF AND ON   Substance Use Topics    Alcohol use: Not Currently    Drug use: No     He  reports that he has quit smoking. He has a 20.00 pack-year smoking history. He has never used smokeless tobacco.  He  reports previous alcohol use. Review of Systems:  Cardiac symptoms as noted above in HPI. All others negative. Denies blurring vision, photophobia, neck pain, hemoptysis, chronic cough, nausea, vomiting, burning micturition, BRBPR, chronic headaches.     Physical Exam:  BP Readings from Last 3 Encounters:   07/20/21 108/61   04/29/21 134/73   04/27/21 (!) 120/59         Pulse Readings from Last 3 Encounters:   07/20/21 68   04/27/21 98   12/23/20 75          Wt Readings from Last 3 Encounters:   07/20/21 89.4 kg (197 lb) 04/29/21 81.2 kg (179 lb)   04/27/21 81.2 kg (179 lb)       Constitutional: Oriented to person, place, and time. HENT: Head: Normocephalic and atraumatic. Neck: No JVD present. Cardiovascular: Regular rhythm. No murmur, gallop or rubs appreciated  Lung: Breath sounds normal. No respiratory distress. No ronchi or rales appreciated  Abdominal: No tenderness. No rebound and no guarding. Musculoskeletal: There is trace lower extremity edema. Review of Data  LABS:   Lab Results   Component Value Date/Time    Sodium 138 12/03/2020 11:05 AM    Potassium 4.0 12/03/2020 11:05 AM    Chloride 103 12/03/2020 11:05 AM    CO2 31 12/03/2020 11:05 AM    Glucose 100 12/03/2020 11:05 AM    BUN 17 12/03/2020 11:05 AM    Creatinine 0.8 12/03/2020 11:05 AM     No flowsheet data found. Lab Results   Component Value Date/Time    ALT (SGPT) 25 08/04/2020 04:50 PM     No results found for: HBA1C, [id number]    EKG  (07/2014) Sinus rhythm at 73 bpm. No ST-t changes of ischemia. Normal AL and QRS. Normal axis    ECHO (04/2021)  Left Ventricle Normal cavity size, wall thickness and systolic function (ejection fraction normal). The estimated EF is 55 - 60%. Visually measured ejection fraction. There is age-appropriate left ventricular diastolic function. Wall Scoring The left ventricular wall motion is normal.            Left Atrium Normal cavity size. Left Atrium volume index is 26.32 mL/m2. Right Ventricle Normal cavity size and global systolic function. Right Atrium Normal cavity size. Aortic Valve Trileaflet valve structure and no stenosis. Trace aortic valve regurgitation. Mitral Valve No stenosis and no regurgitation. Mitral valve non-specific thickening. Tricuspid Valve Normal valve structure and no stenosis. Tricuspid regurgitation is inadequate for estimation of right ventricular systolic pressure. Pulmonic Valve Normal valve structure and no stenosis. Trace regurgitation.    Aorta Normal aortic root and ascending aorta. Pulmonary Artery Pulmonary hypertension not suggested by Doppler findings. IVC/Hepatic Veins Normal central venous pressure (3 mmHg); IVC diameter is less than 21 mm and collapses more than 50% with respiration. CATHETERIZATION (06/2014)  LVGram:(manual injection)   EF: 55% without significant wall motion abnormalities   Mitral Regurgitation: No significant   No significant gradient across the aortic valve   LVEDP ~12-14 mm hg   Coronary angiography:   Dominance: Right   LM: Angiographically normal   LAD: Mid tubular 95% stenosis otherwise normal, Large D1: prox 20% luminal irregularities otherwise normal   LCX: /OM: Angiographically normal   RCA: Dominant, 10-20% prox luminal irregularities otherwise Angiographically normal   PCI Detail for Mid LAD stenosis   Pre-dilation 2.5 X 15 mm TREK , 3.25 X 23 mm Xience ROSA M , Post-dilation 3.5 X 15 mm TREK NC baloon @ 14-16 génesis   Stenosis from 95% --> 0 %     STRESS TEST(09/16)  1. Pharmacologic nuclear stress test using Lexiscan. 2. No EKG evidence of ischemia during Lexiscan infusion. 3. No convincing evidence of reversible defect to suggest ongoing major ischemia. 4. Diaphragmatic attenuation artifact noted over inferior border of the heart. Very small fixed apical defect, which is most likely from apical thinning versus artifact. 5. Calculated ejection fraction of 71% without any obvious wall motion abnormality. End-diastolic volume of 66 mL.     IMPRESSION & PLAN:  Arvin Hair is a 68 y.o. male with coronary artery disease, hypertension, hyperlipidemia and obesity and sleep apnea. Coronary artery disease:  He had an LAD stent in 2014. Nuclear stress and echo in 09/16 without any RWMA and ischemia. Continue BB, imdur, ASA and statin  Continue medical management. Denies any use of nitroglycerin since last time. Denies any symptoms of angina  Echo in 04/2021 with normal EF, low risk study    Hypertension: /61.   Currently on Imdur, Coreg. Continue same    Hyperlipidemia:  He is on atorvastatin 40 mg daily. Continue same    This plan was discussed with patient who is in agreement. Thank you for allowing me to participate in patient care. Please feel free to call me if you have any question or concern.

## 2021-07-20 NOTE — LETTER
7/20/2021    Patient: Soren Bowels   YOB: 1940   Date of Visit: 7/20/2021     Yadi Escobar MD  [address] [address]  Via Fax: [phone]    Dear Yadi Escobar MD,      Thank you for referring Mr. Reagan Redding to [address] [place] [address] for evaluation. My notes for this consultation are attached. If you have questions, please do not hesitate to call me. I look forward to following your patient along with you.       Sincerely,    Donla Lanza MD

## 2022-03-31 ENCOUNTER — OFFICE VISIT (OUTPATIENT)
Dept: CARDIOLOGY CLINIC | Age: 82
End: 2022-03-31
Payer: MEDICARE

## 2022-03-31 VITALS
HEART RATE: 68 BPM | DIASTOLIC BLOOD PRESSURE: 62 MMHG | BODY MASS INDEX: 33.81 KG/M2 | WEIGHT: 197 LBS | SYSTOLIC BLOOD PRESSURE: 126 MMHG | TEMPERATURE: 97.5 F | OXYGEN SATURATION: 96 %

## 2022-03-31 DIAGNOSIS — E78.00 PURE HYPERCHOLESTEROLEMIA: ICD-10-CM

## 2022-03-31 DIAGNOSIS — I25.83 CORONARY ARTERY DISEASE DUE TO LIPID RICH PLAQUE: Primary | ICD-10-CM

## 2022-03-31 DIAGNOSIS — I25.10 CORONARY ARTERY DISEASE DUE TO LIPID RICH PLAQUE: Primary | ICD-10-CM

## 2022-03-31 DIAGNOSIS — I10 ESSENTIAL HYPERTENSION WITH GOAL BLOOD PRESSURE LESS THAN 140/90: ICD-10-CM

## 2022-03-31 PROCEDURE — G8417 CALC BMI ABV UP PARAM F/U: HCPCS | Performed by: INTERNAL MEDICINE

## 2022-03-31 PROCEDURE — G8754 DIAS BP LESS 90: HCPCS | Performed by: INTERNAL MEDICINE

## 2022-03-31 PROCEDURE — G8510 SCR DEP NEG, NO PLAN REQD: HCPCS | Performed by: INTERNAL MEDICINE

## 2022-03-31 PROCEDURE — 99214 OFFICE O/P EST MOD 30 MIN: CPT | Performed by: INTERNAL MEDICINE

## 2022-03-31 PROCEDURE — G8752 SYS BP LESS 140: HCPCS | Performed by: INTERNAL MEDICINE

## 2022-03-31 PROCEDURE — G8428 CUR MEDS NOT DOCUMENT: HCPCS | Performed by: INTERNAL MEDICINE

## 2022-03-31 PROCEDURE — G8536 NO DOC ELDER MAL SCRN: HCPCS | Performed by: INTERNAL MEDICINE

## 2022-03-31 PROCEDURE — 1101F PT FALLS ASSESS-DOCD LE1/YR: CPT | Performed by: INTERNAL MEDICINE

## 2022-03-31 NOTE — PROGRESS NOTES
[facility]    Mr. Paula Ballard is a 80 y.o. male with history of coronary artery disease s/p LAD stent (06/2014), sleep apnea, hypertension, hyperlipidemia and obesity. Patient is here today for follow-up appointment. He denies any hospital admission or ER visits since last time. He denies any use of nitroglycerin. Denies any palpitation, presyncope or syncope. Continues to have back pain. Occasionally has some nonexertional chest discomfort and gas feeling. He uses salon patch and that helps with the pain. He does not recall having any chest pain concerning and reminiscent of his angina from before. He denies any use of nitroglycerin  Denies any nausea, vomiting, abdominal pain, fever, chills, sputum production. No hematuria or other bleeding complaints    Past Medical History:   Diagnosis Date    Asbestosis(501)     CAD (coronary artery disease)     (06/2014) Mid LAD (3.25 X 23 mm ) Xience ROSA M    CPAP (continuous positive airway pressure) dependence     Diabetes ([address]     Diverticulitis     GERD (gastroesophageal reflux disease)     Hemorrhoids     Hypercholesteremia     Hypertension     Ill-defined condition     constant pain- back ,rt leg    Sleep apnea     On CPAP    Vitamin D deficiency        Past Surgical History:   Procedure Laterality Date    HX APPENDECTOMY      HX ORTHOPAEDIC  2017    HIP REPLCEMENT    HX ORTHOPAEDIC      REJUVINIX- KNNE    IN CARDIAC SURG PROCEDURE UNLIST  2014    heart stent    IN CHEST SURGERY PROCEDURE UNLISTED  1971    lobectomy left lung       Current Outpatient Medications   Medication Sig    nitroglycerin (NITROSTAT) 0.4 mg SL tablet 1 Tab by SubLINGual route as needed for Chest Pain.  isosorbide mononitrate ER (IMDUR) 30 mg tablet Take 30 mg by mouth daily.  atorvastatin (LIPITOR) 40 mg tablet Take 1 Tab by mouth daily.     carvedilol (COREG) 3.125 mg tablet TAKE 1 TABLET TWICE A DAY WITH MEALS    aspirin 81 mg tablet Take 162 mg by mouth daily. PT STATES INSTRUCTED BY DR. Luis Craig TO HOLD 10 DAYS BEFORE SURGERY    omega-3 fatty acids-vitamin e (FISH OIL) 1,000 mg cap Take 1 Cap by mouth daily.  metFORMIN (GLUMETZA) 1,000 mg TG24 24 hour tablet Take  by mouth daily.  glimepiride (AMARYL) 4 mg tablet Take  by mouth every evening.  ibuprofen 100 mg tablet Take 200 mg by mouth every four (4) hours as needed for Pain.  Calcium-Cholecalciferol, D3, (Calcium 600 with Vitamin D3) 600 mg(1,500mg) -400 unit chew Take  by mouth daily.  glucosam/chond/hyalu/CF borate (MOVE FREE JOINT HEALTH PO) Take 1 Tab by mouth daily.  gabapentin (NEURONTIN) 300 mg capsule Take 300 mg by mouth three (3) times daily.  metFORMIN (GLUCOPHAGE) 500 mg tablet every evening.  ergocalciferol (ERGOCALCIFEROL) 50,000 unit capsule Take 50,000 Units by mouth every thirty (30) days. Twice a month    multivitamin (ONE A DAY) tablet Take 1 Tab by mouth daily. No current facility-administered medications for this visit. Allergies and Sensitivities:  No Known Allergies    Family History:  Family History   Problem Relation Age of Onset    Heart Attack Father        Social History:  Social History     Tobacco Use    Smoking status: Former Smoker     Packs/day: 1.00     Years: 20.00     Pack years: 20.00    Smokeless tobacco: Never Used    Tobacco comment: QUIT 1997; SMOKED X 20 YRS OFF AND ON   Substance Use Topics    Alcohol use: Not Currently    Drug use: No     He  reports that he has quit smoking. He has a 20.00 pack-year smoking history. He has never used smokeless tobacco.  He  reports previous alcohol use. Review of Systems:  Cardiac symptoms as noted above in HPI. All others negative. Denies blurring vision, photophobia, neck pain, hemoptysis, chronic cough, nausea, vomiting, burning micturition, BRBPR, chronic headaches.     Physical Exam:  BP Readings from Last 3 Encounters:   03/31/22 126/62   07/20/21 108/61   04/29/21 134/73         Pulse Readings from Last 3 Encounters:   03/31/22 68   07/20/21 68   04/27/21 98          Wt Readings from Last 3 Encounters:   03/31/22 89.4 kg (197 lb)   07/20/21 89.4 kg (197 lb)   04/29/21 81.2 kg (179 lb)       Constitutional: Oriented to person, place, and time. HENT: Head: Normocephalic and atraumatic. Neck: No JVD present. Cardiovascular: Regular rhythm. No murmur, gallop or rubs appreciated  Lung: Breath sounds normal. No respiratory distress. No ronchi or rales appreciated  Abdominal: No tenderness. No rebound and no guarding. Musculoskeletal: There is trace lower extremity edema. Review of Data  LABS:   Lab Results   Component Value Date/Time    Sodium 138 12/03/2020 11:05 AM    Potassium 4.0 12/03/2020 11:05 AM    Chloride 103 12/03/2020 11:05 AM    CO2 31 12/03/2020 11:05 AM    Glucose 100 12/03/2020 11:05 AM    BUN 17 12/03/2020 11:05 AM    Creatinine 0.8 12/03/2020 11:05 AM     No flowsheet data found. Lab Results   Component Value Date/Time    ALT (SGPT) 25 08/04/2020 04:50 PM     No results found for: HBA1C, [id number]    EKG  (07/2014) Sinus rhythm at 73 bpm. No ST-t changes of ischemia. Normal SD and QRS. Normal axis    ECHO (04/2021)  Left Ventricle Normal cavity size, wall thickness and systolic function (ejection fraction normal). The estimated EF is 55 - 60%. Visually measured ejection fraction. There is age-appropriate left ventricular diastolic function. Wall Scoring The left ventricular wall motion is normal.            Left Atrium Normal cavity size. Left Atrium volume index is 26.32 mL/m2. Right Ventricle Normal cavity size and global systolic function. Right Atrium Normal cavity size. Aortic Valve Trileaflet valve structure and no stenosis. Trace aortic valve regurgitation. Mitral Valve No stenosis and no regurgitation. Mitral valve non-specific thickening.    Tricuspid Valve Normal valve structure and no stenosis. Tricuspid regurgitation is inadequate for estimation of right ventricular systolic pressure. Pulmonic Valve Normal valve structure and no stenosis. Trace regurgitation. Aorta Normal aortic root and ascending aorta. Pulmonary Artery Pulmonary hypertension not suggested by Doppler findings. IVC/Hepatic Veins Normal central venous pressure (3 mmHg); IVC diameter is less than 21 mm and collapses more than 50% with respiration. CATHETERIZATION (06/2014)  LVGram:(manual injection)   EF: 55% without significant wall motion abnormalities   Mitral Regurgitation: No significant   No significant gradient across the aortic valve   LVEDP ~12-14 mm hg   Coronary angiography:   Dominance: Right   LM: Angiographically normal   LAD: Mid tubular 95% stenosis otherwise normal, Large D1: prox 20% luminal irregularities otherwise normal   LCX: /OM: Angiographically normal   RCA: Dominant, 10-20% prox luminal irregularities otherwise Angiographically normal   PCI Detail for Mid LAD stenosis   Pre-dilation 2.5 X 15 mm TREK , 3.25 X 23 mm Xience ROSA M , Post-dilation 3.5 X 15 mm TREK NC baloon @ 14-16 génesis   Stenosis from 95% --> 0 %     STRESS TEST(09/16)  1. Pharmacologic nuclear stress test using Lexiscan. 2. No EKG evidence of ischemia during Lexiscan infusion. 3. No convincing evidence of reversible defect to suggest ongoing major ischemia. 4. Diaphragmatic attenuation artifact noted over inferior border of the heart. Very small fixed apical defect, which is most likely from apical thinning versus artifact. 5. Calculated ejection fraction of 71% without any obvious wall motion abnormality. End-diastolic volume of 66 mL.     IMPRESSION & PLAN:  Ysabel Crabtree is a 80 y.o. male with coronary artery disease, hypertension, hyperlipidemia and obesity and sleep apnea. Coronary artery disease:  He had an LAD stent in 2014. Nuclear stress and echo in 09/16 without any RWMA and ischemia.   Continue BB, imdur, ASA and statin  Continue medical management. Denies any use of nitroglycerin since last time. Echo in 04/2021 with normal EF, low risk study  Occasionally has some chest discomfort which  resolves with salon patch and belching. Tells me that this is different than his angina and is not reminiscent of his angina in the past.  Has not used any nitroglycerin. If continues to be a problem, can consider stress test.  Does not appear to be angina at this time    Hypertension: /62. Currently on Imdur, Coreg. Continue same    Hyperlipidemia:  He is on atorvastatin 40 mg daily. Continue same    This plan was discussed with patient who is in agreement. Thank you for allowing me to participate in patient care. Please feel free to call me if you have any question or concern.

## 2022-03-31 NOTE — LETTER
3/31/2022    Patient: Codi Gustafson   YOB: 1940   Date of Visit: 3/31/2022     Isatu Cortez MD  [address] [address]  Via Fax: [phone]    Dear Isatu Cortez MD,      Thank you for referring Mr. Lina Kenyon to [address] [place] [address] for evaluation. My notes for this consultation are attached. If you have questions, please do not hesitate to call me. I look forward to following your patient along with you.       Sincerely,    Merlin Frees, MD

## 2022-03-31 NOTE — PROGRESS NOTES
Identified pt with two pt identifiers(name and ). Reviewed record in preparation for visit and have obtained necessary documentation. Alfredo Santiago presents today for   Chief Complaint   Patient presents with    Follow-up       Pt denies  DIZZINESS, SOB, CHEST PAIN/ PRESSURE, FATIGUE/WEAKNESS, HEADACHES, SWELLING. Alfredo Santiago preferred language for health care discussion is english/other. Personal Protective Equipment:   Personal Protective Equipment was used including: mask-surgical and hands-gloves. Patient was placed on no precaution(s). Patient was masked. Precautions:   Patient currently on None  Patient currently roomed with door closed. Is someone accompanying this pt? Yes     Is the patient using any DME equipment during OV? No     Depression Screening:  3 most recent PHQ Screens 3/31/2022   Little interest or pleasure in doing things Not at all   Feeling down, depressed, irritable, or hopeless Not at all   Total Score PHQ 2 0       Learning Assessment:  Learning Assessment 2017   PRIMARY LEARNER Patient   PRIMARY LANGUAGE ENGLISH   LEARNER PREFERENCE PRIMARY DEMONSTRATION   ANSWERED BY Patient   RELATIONSHIP SELF       Abuse Screening:  No flowsheet data found. Fall Risk  Fall Risk Assessment, last 12 mths 3/31/2022   Able to walk? Yes   Fall in past 12 months? 0   Do you feel unsteady? 0   Are you worried about falling 0       Pt currently taking Anticoagulant therapy? No   Pt currently taking Antiplatelet therapy? Yes     Coordination of Care:  1. Have you been to the ER, urgent care clinic since your last visit? Hospitalized since your last visit? No     2. Have you seen or consulted any other health care providers outside of the [address] [place] [address] since your last visit? Include any pap smears or colon screening. yes     Please see Red banners under Allergies and Med Rec to remove outside inquires.  All correct information has been verified with patient and added to chart.      Medication's patient's would liked removed has been marked not taking to be removed per Verbal order and read back per Salena Bernardo MD

## 2022-06-30 ENCOUNTER — HOSPITAL ENCOUNTER (OUTPATIENT)
Dept: LAB | Age: 82
Discharge: HOME | End: 2022-06-30
Payer: MEDICARE

## 2022-06-30 LAB
BASOPHILS # BLD: 0.1 K/UL (ref 0–0.1)
BASOPHILS NFR BLD: 1 % (ref 0–2)
DIFFERENTIAL METHOD BLD: ABNORMAL
EOSINOPHIL # BLD: 0.3 K/UL (ref 0–0.4)
EOSINOPHIL NFR BLD: 5 % (ref 0–5)
ERYTHROCYTE [DISTWIDTH] IN BLOOD BY AUTOMATED COUNT: 11.9 % (ref 11.6–14.5)
HCT VFR BLD AUTO: 40.4 % (ref 36–48)
HGB BLD-MCNC: 13 G/DL (ref 13–16)
IMM GRANULOCYTES # BLD AUTO: 0 K/UL (ref 0–0.04)
IMM GRANULOCYTES NFR BLD AUTO: 0 % (ref 0–0.5)
LYMPHOCYTES # BLD: 1.7 K/UL (ref 0.9–3.6)
LYMPHOCYTES NFR BLD: 24 % (ref 21–52)
MCH RBC QN AUTO: 32.3 PG (ref 24–34)
MCHC RBC AUTO-ENTMCNC: 32.2 G/DL (ref 31–37)
MCV RBC AUTO: 100.5 FL (ref 78–100)
MONOCYTES # BLD: 0.6 K/UL (ref 0.05–1.2)
MONOCYTES NFR BLD: 8 % (ref 3–10)
NEUTS SEG # BLD: 4.5 K/UL (ref 1.8–8)
NEUTS SEG NFR BLD: 63 % (ref 40–73)
NRBC # BLD: 0 K/UL (ref 0–0.01)
NRBC BLD-RTO: 0 PER 100 WBC
PLATELET # BLD AUTO: 237 K/UL (ref 135–420)
PMV BLD AUTO: 9.5 FL (ref 9.2–11.8)
RBC # BLD AUTO: 4.02 M/UL (ref 4.35–5.65)
WBC # BLD AUTO: 7.2 K/UL (ref 4.6–13.2)

## 2022-06-30 PROCEDURE — 86003 ALLG SPEC IGE CRUDE XTRC EA: CPT

## 2022-06-30 PROCEDURE — 36415 COLL VENOUS BLD VENIPUNCTURE: CPT

## 2022-06-30 PROCEDURE — 85025 COMPLETE CBC W/AUTO DIFF WBC: CPT

## 2022-06-30 PROCEDURE — 82785 ASSAY OF IGE: CPT

## 2022-07-05 LAB
A ALTERNATA IGE QN: <0.1 KU/L
A FUMIGATUS IGE QN: 0.12 KU/L
AMER ROACH IGE QN: 0.12 KU/L
AMER SYCAMORE IGE QN: <0.1 KU/L
BAHIA GRASS IGE QN: <0.1 KU/L
BERMUDA GRASS IGE QN: <0.1 KU/L
BOXELDER IGE QN: <0.1 KU/L
C HERBARUM IGE QN: <0.1 KU/L
CAT DANDER IGG QN: <0.1 KU/L
CLASS DESCRIPTION, 600268: ABNORMAL
COMMON RAGWEED IGE QN: 0.21 KU/L
D FARINAE IGE QN: 0.39 KU/L
D PTERONYSS IGE QN: 0.39 KU/L
DEPRECATED IGE QN: <0.1 KU/L
DOG DANDER IGE QN: <0.1 KU/L
ENGL PLANTAIN IGE QN: 0.18 KU/L
IGE SERPL-ACNC: 327 IU/ML (ref 6–495)
JOHNSON GRASS IGE QN: <0.1 KU/L
M RACEMOSUS IGE QN: <0.1 KU/L
MT JUNIPER IGE QN: <0.1 KU/L
MUGWORT IGE QN: <0.1 KU/L
NETTLE IGE QN: 0.19 KU/L
P NOTATUM IGE QN: <0.1 KU/L
S BOTRYOSUM IGE QN: <0.1 KU/L
SHEEP SORREL IGE QN: <0.1 KU/L
SWEET GUM IGE QN: <0.1 KU/L
TIMOTHY IGE QN: 0.25 KU/L
WHITE BIRCH IGE QN: <0.1 KU/L
WHITE ELM IGG QN: <0.1 KU/L
WHITE HICKORY IGE QN: <0.1 KU/L
WHITE MULBERRY IGE QN: <0.1 KU/L
WHITE OAK IGE QN: <0.1 KU/L

## 2022-07-07 ENCOUNTER — OFFICE VISIT (OUTPATIENT)
Dept: CARDIOLOGY CLINIC | Age: 82
End: 2022-07-07
Payer: MEDICARE

## 2022-07-07 VITALS
BODY MASS INDEX: 33.81 KG/M2 | SYSTOLIC BLOOD PRESSURE: 122 MMHG | TEMPERATURE: 98.1 F | HEART RATE: 71 BPM | OXYGEN SATURATION: 98 % | WEIGHT: 197 LBS | DIASTOLIC BLOOD PRESSURE: 64 MMHG

## 2022-07-07 DIAGNOSIS — I25.10 CORONARY ARTERY DISEASE WITHOUT ANGINA PECTORIS, UNSPECIFIED VESSEL OR LESION TYPE, UNSPECIFIED WHETHER NATIVE OR TRANSPLANTED HEART: Primary | ICD-10-CM

## 2022-07-07 PROCEDURE — 1123F ACP DISCUSS/DSCN MKR DOCD: CPT | Performed by: INTERNAL MEDICINE

## 2022-07-07 PROCEDURE — G8417 CALC BMI ABV UP PARAM F/U: HCPCS | Performed by: INTERNAL MEDICINE

## 2022-07-07 PROCEDURE — G8428 CUR MEDS NOT DOCUMENT: HCPCS | Performed by: INTERNAL MEDICINE

## 2022-07-07 PROCEDURE — G8752 SYS BP LESS 140: HCPCS | Performed by: INTERNAL MEDICINE

## 2022-07-07 PROCEDURE — 93000 ELECTROCARDIOGRAM COMPLETE: CPT | Performed by: INTERNAL MEDICINE

## 2022-07-07 PROCEDURE — 99214 OFFICE O/P EST MOD 30 MIN: CPT | Performed by: INTERNAL MEDICINE

## 2022-07-07 PROCEDURE — G8536 NO DOC ELDER MAL SCRN: HCPCS | Performed by: INTERNAL MEDICINE

## 2022-07-07 PROCEDURE — G8510 SCR DEP NEG, NO PLAN REQD: HCPCS | Performed by: INTERNAL MEDICINE

## 2022-07-07 PROCEDURE — 1101F PT FALLS ASSESS-DOCD LE1/YR: CPT | Performed by: INTERNAL MEDICINE

## 2022-07-07 PROCEDURE — G8754 DIAS BP LESS 90: HCPCS | Performed by: INTERNAL MEDICINE

## 2022-07-07 NOTE — PROGRESS NOTES
Identified pt with two pt identifiers(name and ). Reviewed record in preparation for visit and have obtained necessary documentation. Tamica Steiner presents today for   Chief Complaint   Patient presents with    Follow-up     3 months       Pt denies DIZZINESS, SOB, CHEST PAIN/ PRESSURE, FATIGUE/WEAKNESS, HEADACHES, SWELLING. Tamica Steiner preferred language for health care discussion is english/other. Personal Protective Equipment:   Personal Protective Equipment was used including: mask-surgical and hands-gloves. Patient was placed on no precaution(s). Patient was masked. Precautions:   Patient currently on None  Patient currently roomed with door closed. Is someone accompanying this pt? no    Is the patient using any DME equipment during [address]? no    Depression Screening:  3 most recent PHQ Screens 2022   Little interest or pleasure in doing things Not at all   Feeling down, depressed, irritable, or hopeless Not at all   Total Score PHQ 2 0       Learning Assessment:  Learning Assessment 2017   PRIMARY LEARNER Patient   PRIMARY LANGUAGE ENGLISH   LEARNER PREFERENCE PRIMARY DEMONSTRATION   ANSWERED BY Patient   RELATIONSHIP SELF       Abuse Screening:  No flowsheet data found. Fall Risk  Fall Risk Assessment, last 12 mths 3/31/2022   Able to walk? Yes   Fall in past 12 months? 0   Do you feel unsteady? 0   Are you worried about falling 0       Pt currently taking Anticoagulant therapy? no  Pt currently taking Antiplatelet therapy? no    Coordination of Care:  1. Have you been to the ER, urgent care clinic since your last visit? Hospitalized since your last visit? no    2. Have you seen or consulted any other health care providers outside of the [address] [place] [address] since your last visit? Include any pap smears or colon screening. no      Please see Red banners under Allergies and Med Rec to remove outside inquires.  All correct information has been verified with patient and added to chart.      Medication's patient's would liked removed has been marked not taking to be removed per Verbal order and read back per John Haynes MD

## 2022-07-07 NOTE — PATIENT INSTRUCTIONS
Testing   Treadmill Stress test    Please call our office at [phone] to schedule testing after August 25, 2022.

## 2022-07-07 NOTE — PROGRESS NOTES
[facility]    Mr. Margarita Garza is a 80 y.o. male with history of coronary artery disease s/p LAD stent (06/2014), sleep apnea, hypertension, hyperlipidemia and obesity. Patient is here today for follow-up appointment. He denies any hospital admission or ER visits since last time. Denies any palpitation, presyncope or syncope. Continues to have back pain. Occasionally has some nonexertional chest discomfort and gas feeling. He used nitroglycerin 4 days ago. He uses nitroglycerin once or maybe twice a month over the last 4 months. Denies any nausea, vomiting, abdominal pain, fever, chills, sputum production. No hematuria or other bleeding complaints    Past Medical History:   Diagnosis Date    Asbestosis(501)     CAD (coronary artery disease)     (06/2014) Mid LAD (3.25 X 23 mm ) Xience ROSA M    CPAP (continuous positive airway pressure) dependence     Diabetes ([facility] [address]     Diverticulitis     GERD (gastroesophageal reflux disease)     Hemorrhoids     Hypercholesteremia     Hypertension     Ill-defined condition     constant pain- back ,rt leg    Sleep apnea     On CPAP    Vitamin D deficiency        Past Surgical History:   Procedure Laterality Date    HX APPENDECTOMY      HX ORTHOPAEDIC  2017    HIP REPLCEMENT    HX ORTHOPAEDIC      REJUVINIX- KNNE    [place] CARDIAC SURG PROCEDURE UNLIST  2014    heart stent    [place] CHEST SURGERY PROCEDURE UNLISTED  1971    lobectomy left lung       Current Outpatient Medications   Medication Sig    nitroglycerin (NITROSTAT) 0.4 mg SL tablet 1 Tab by SubLINGual route as needed for Chest Pain.  isosorbide mononitrate ER (IMDUR) 30 mg tablet Take 30 mg by mouth daily.  metFORMIN (GLUMETZA) 1,000 mg TG24 24 hour tablet Take  by mouth daily.  glimepiride (AMARYL) 4 mg tablet Take  by mouth every evening.     ibuprofen 100 mg tablet Take 200 mg by mouth every four (4) hours as needed for Pain.    Calcium-Cholecalciferol, D3, (Calcium 600 with Vitamin D3) 600 mg(1,500mg) -400 unit chew Take  by mouth daily.  glucosam/chond/hyalu/CF borate (MOVE FREE JOINT HEALTH PO) Take 1 Tab by mouth daily.  gabapentin (NEURONTIN) 300 mg capsule Take 300 mg by mouth three (3) times daily.  metFORMIN (GLUCOPHAGE) 500 mg tablet every evening.  atorvastatin (LIPITOR) 40 mg tablet Take 1 Tab by mouth daily.  carvedilol (COREG) 3.125 mg tablet TAKE 1 TABLET TWICE A DAY WITH MEALS    aspirin 81 mg tablet Take 162 mg by mouth daily. PT STATES INSTRUCTED BY DR. Millie Nunez TO HOLD 10 DAYS BEFORE SURGERY    ergocalciferol (ERGOCALCIFEROL) 50,000 unit capsule Take 50,000 Units by mouth every thirty (30) days. Twice a month    omega-3 fatty acids-vitamin e (FISH OIL) 1,000 mg cap Take 1 Cap by mouth daily.  multivitamin (ONE A DAY) tablet Take 1 Tab by mouth daily. No current facility-administered medications for this visit. Allergies and Sensitivities:  No Known Allergies    Family History:  Family History   Problem Relation Age of Onset    Heart Attack Father        Social History:  Social History     Tobacco Use    Smoking status: Former Smoker     Packs/day: 1.00     Years: 20.00     Pack years: 20.00    Smokeless tobacco: Never Used    Tobacco comment: QUIT 1997; SMOKED X 20 YRS OFF AND ON   Substance Use Topics    Alcohol use: Not Currently    Drug use: No     He  reports that he has quit smoking. He has a 20.00 pack-year smoking history. He has never used smokeless tobacco.  He  reports previous alcohol use. Review of Systems:  Cardiac symptoms as noted above in HPI. All others negative. Denies blurring vision, photophobia, neck pain, hemoptysis, chronic cough, nausea, vomiting, burning micturition, BRBPR, chronic headaches.     Physical Exam:  BP Readings from Last 3 Encounters:   07/07/22 122/64   03/31/22 126/62   07/20/21 108/61         Pulse Readings from Last 3 Encounters: 07/07/22 71   03/31/22 68   07/20/21 68          Wt Readings from Last 3 Encounters:   07/07/22 89.4 kg (197 lb)   03/31/22 89.4 kg (197 lb)   07/20/21 89.4 kg (197 lb)       Constitutional: Oriented to person, place, and time. HENT: Head: Normocephalic and atraumatic. Neck: No JVD present. Cardiovascular: Regular rhythm. No murmur, gallop or rubs appreciated  Lung: Breath sounds normal. No respiratory distress. No ronchi or rales appreciated  Abdominal: No tenderness. No rebound and no guarding. Musculoskeletal: There is trace lower extremity edema. Review of Data  LABS:   Lab Results   Component Value Date/Time    Sodium 138 12/03/2020 11:05 AM    Potassium 4.0 12/03/2020 11:05 AM    Chloride 103 12/03/2020 11:05 AM    CO2 31 12/03/2020 11:05 AM    Glucose 100 12/03/2020 11:05 AM    BUN 17 12/03/2020 11:05 AM    Creatinine 0.8 12/03/2020 11:05 AM     No flowsheet data found. Lab Results   Component Value Date/Time    ALT (SGPT) 25 08/04/2020 04:50 PM     No results found for: HBA1C, [id number]    EKG  (07/2014) Sinus rhythm at 73 bpm. No ST-t changes of ischemia. Normal ME and QRS. Normal axis    ECHO (04/2021)  Left Ventricle Normal cavity size, wall thickness and systolic function (ejection fraction normal). The estimated EF is 55 - 60%. Visually measured ejection fraction. There is age-appropriate left ventricular diastolic function. Wall Scoring The left ventricular wall motion is normal.            Left Atrium Normal cavity size. Left Atrium volume index is 26.32 mL/m2. Right Ventricle Normal cavity size and global systolic function. Right Atrium Normal cavity size. Aortic Valve Trileaflet valve structure and no stenosis. Trace aortic valve regurgitation. Mitral Valve No stenosis and no regurgitation. Mitral valve non-specific thickening. Tricuspid Valve Normal valve structure and no stenosis.  Tricuspid regurgitation is inadequate for estimation of right ventricular systolic pressure. Pulmonic Valve Normal valve structure and no stenosis. Trace regurgitation. Aorta Normal aortic root and ascending aorta. Pulmonary Artery Pulmonary hypertension not suggested by Doppler findings. IVC/Hepatic Veins Normal central venous pressure (3 mmHg); IVC diameter is less than 21 mm and collapses more than 50% with respiration. CATHETERIZATION (06/2014)  LVGram:(manual injection)   EF: 55% without significant wall motion abnormalities   Mitral Regurgitation: No significant   No significant gradient across the aortic valve   LVEDP ~12-14 mm hg   Coronary angiography:   Dominance: Right   LM: Angiographically normal   LAD: Mid tubular 95% stenosis otherwise normal, Large D1: prox 20% luminal irregularities otherwise normal   LCX: /OM: Angiographically normal   RCA: Dominant, 10-20% prox luminal irregularities otherwise Angiographically normal   PCI Detail for Mid LAD stenosis   Pre-dilation 2.5 X 15 mm TREK , 3.25 X 23 mm Xience ROSA M , Post-dilation 3.5 X 15 mm TREK NC baloon @ 14-16 génesis   Stenosis from 95% --> 0 %     STRESS TEST(09/16)  1. Pharmacologic nuclear stress test using Lexiscan. 2. No EKG evidence of ischemia during Lexiscan infusion. 3. No convincing evidence of reversible defect to suggest ongoing major ischemia. 4. Diaphragmatic attenuation artifact noted over inferior border of the heart. Very small fixed apical defect, which is most likely from apical thinning versus artifact. 5. Calculated ejection fraction of 71% without any obvious wall motion abnormality. End-diastolic volume of 66 mL.     IMPRESSION & PLAN:  Kenrick Alves is a 80 y.o. male with coronary artery disease, hypertension, hyperlipidemia and obesity and sleep apnea. Coronary artery disease:  He had an LAD stent in 2014. Nuclear stress and echo in 09/16 without any RWMA and ischemia. Continue BB, imdur, ASA and statin  Continue medical management. Denies any use of nitroglycerin since last time. Echo in 04/2021 with normal EF, low risk study  Using NTG about twice a month. Stable over last 6 months. Will order stress test.  Overall symptoms are stable according to patient. No rest pain. Hypertension: /64. Currently on Imdur, Coreg. Continue same    Hyperlipidemia:  He is on atorvastatin 40 mg daily. Continue same    This plan was discussed with patient who is in agreement. Thank you for allowing me to participate in patient care. Please feel free to call me if you have any question or concern.

## 2022-07-14 ENCOUNTER — TELEPHONE (OUTPATIENT)
Dept: CARDIOLOGY CLINIC | Age: 82
End: 2022-07-14

## 2022-08-31 RX ORDER — NITROGLYCERIN 0.4 MG/1
TABLET SUBLINGUAL
Qty: 25 TABLET | Refills: 2 | Status: SHIPPED | OUTPATIENT
Start: 2022-08-31

## 2022-10-12 LAB — EF %, EXTERNAL: NORMAL

## 2022-11-03 ENCOUNTER — OFFICE VISIT (OUTPATIENT)
Dept: CARDIOLOGY CLINIC | Age: 82
End: 2022-11-03
Payer: MEDICARE

## 2022-11-03 VITALS
WEIGHT: 196.8 LBS | SYSTOLIC BLOOD PRESSURE: 136 MMHG | OXYGEN SATURATION: 97 % | DIASTOLIC BLOOD PRESSURE: 78 MMHG | TEMPERATURE: 98.1 F | HEART RATE: 71 BPM | BODY MASS INDEX: 33.78 KG/M2

## 2022-11-03 DIAGNOSIS — I10 ESSENTIAL HYPERTENSION WITH GOAL BLOOD PRESSURE LESS THAN 140/90: ICD-10-CM

## 2022-11-03 DIAGNOSIS — I25.10 CORONARY ARTERY DISEASE DUE TO LIPID RICH PLAQUE: Primary | ICD-10-CM

## 2022-11-03 DIAGNOSIS — E78.00 PURE HYPERCHOLESTEROLEMIA: ICD-10-CM

## 2022-11-03 DIAGNOSIS — I25.83 CORONARY ARTERY DISEASE DUE TO LIPID RICH PLAQUE: Primary | ICD-10-CM

## 2022-11-03 PROCEDURE — 3074F SYST BP LT 130 MM HG: CPT | Performed by: INTERNAL MEDICINE

## 2022-11-03 PROCEDURE — G8536 NO DOC ELDER MAL SCRN: HCPCS | Performed by: INTERNAL MEDICINE

## 2022-11-03 PROCEDURE — G8417 CALC BMI ABV UP PARAM F/U: HCPCS | Performed by: INTERNAL MEDICINE

## 2022-11-03 PROCEDURE — G8428 CUR MEDS NOT DOCUMENT: HCPCS | Performed by: INTERNAL MEDICINE

## 2022-11-03 PROCEDURE — G8752 SYS BP LESS 140: HCPCS | Performed by: INTERNAL MEDICINE

## 2022-11-03 PROCEDURE — 99214 OFFICE O/P EST MOD 30 MIN: CPT | Performed by: INTERNAL MEDICINE

## 2022-11-03 PROCEDURE — 1123F ACP DISCUSS/DSCN MKR DOCD: CPT | Performed by: INTERNAL MEDICINE

## 2022-11-03 PROCEDURE — G8510 SCR DEP NEG, NO PLAN REQD: HCPCS | Performed by: INTERNAL MEDICINE

## 2022-11-03 PROCEDURE — G8754 DIAS BP LESS 90: HCPCS | Performed by: INTERNAL MEDICINE

## 2022-11-03 PROCEDURE — 3078F DIAST BP <80 MM HG: CPT | Performed by: INTERNAL MEDICINE

## 2022-11-03 PROCEDURE — 1101F PT FALLS ASSESS-DOCD LE1/YR: CPT | Performed by: INTERNAL MEDICINE

## 2022-11-03 RX ORDER — METOPROLOL SUCCINATE 25 MG/1
25 TABLET, EXTENDED RELEASE ORAL DAILY
Qty: 90 TABLET | Refills: 3 | Status: SHIPPED | OUTPATIENT
Start: 2022-11-03

## 2022-11-03 RX ORDER — BUDESONIDE, GLYCOPYRROLATE, AND FORMOTEROL FUMARATE 160; 9; 4.8 UG/1; UG/1; UG/1
AEROSOL, METERED RESPIRATORY (INHALATION) DAILY
COMMUNITY
Start: 2022-11-01

## 2022-11-03 RX ORDER — ALBUTEROL SULFATE 90 UG/1
AEROSOL, METERED RESPIRATORY (INHALATION)
COMMUNITY
Start: 2022-08-31

## 2022-11-03 NOTE — PROGRESS NOTES
Identified pt with two pt identifiers(name and ). Reviewed record in preparation for visit and have obtained necessary documentation. Hartford Merlin presents today for   Chief Complaint   Patient presents with    Cardiac Testing     Treadmill stress    Follow-up       Pt denies DIZZINESS, SOB, CHEST PAIN/ PRESSURE, FATIGUE/WEAKNESS, HEADACHES, SWELLING. Hartford Merlin preferred language for health care discussion is english/other. Personal Protective Equipment:   Personal Protective Equipment was used including: mask-surgical and hands-gloves. Patient was placed on no precaution(s). Patient was masked. Precautions:   Patient currently on None  Patient currently roomed with door closed. Is someone accompanying this pt? yes    Is the patient using any DME equipment during OV? No    Depression Screening:  3 most recent PHQ Screens 2022   Little interest or pleasure in doing things Not at all   Feeling down, depressed, irritable, or hopeless Not at all   Total Score PHQ 2 0       Learning Assessment:  Learning Assessment 2017   PRIMARY LEARNER Patient   PRIMARY LANGUAGE ENGLISH   LEARNER PREFERENCE PRIMARY DEMONSTRATION   ANSWERED BY Patient   RELATIONSHIP SELF       Abuse Screening:  No flowsheet data found. Fall Risk  Fall Risk Assessment, last 12 mths 3/31/2022   Able to walk? Yes   Fall in past 12 months? 0   Do you feel unsteady? 0   Are you worried about falling 0       Pt currently taking Anticoagulant therapy? no  Pt currently taking Antiplatelet therapy? yes    Coordination of Care:  1. Have you been to the ER, urgent care clinic since your last visit? Hospitalized since your last visit? no    2. Have you seen or consulted any other health care providers outside of the [address] [place] [address] since your last visit? Include any pap smears or colon screening. yes      Please see Red banners under Allergies and Med Rec to remove outside inquires.  All correct information has been verified with patient and added to chart.      Medication's patient's would liked removed has been marked not taking to be removed per Verbal order and read back per Alireza Tolbert MD

## 2022-11-03 NOTE — PATIENT INSTRUCTIONS
New Medication/Medication Changes  Stop coreg 25 mg daily   Start toprol 25 mg xl daily     **please allow 24-48 hrs for medication to be escribed to pharmacy** If you need any refills on medications please contact your pharmacy so that the request can be escribed to the provider for review.

## 2022-11-03 NOTE — LETTER
11/3/2022    Patient: Josey Romero   YOB: 1940   Date of Visit: 11/3/2022     Whitney Worrell MD  [address] [address]  Via Fax: [phone]    Dear Whitney Worrell MD,      Thank you for referring Mr. Marie Hall to [address] [place] [address] for evaluation. My notes for this consultation are attached. If you have questions, please do not hesitate to call me. I look forward to following your patient along with you.       Sincerely,    Radha Sands MD

## 2022-11-03 NOTE — PROGRESS NOTES
[facility]    Mr. Stephen Isbell is a [de-identified] y.o. male with history of coronary artery disease s/p LAD stent (06/2014), sleep apnea, hypertension, hyperlipidemia and obesity. Patient is here today for follow-up appointment. He denies any hospital admission or ER visits since last time. He underwent stress testing since last time. He feels much better since last visit. No chest pain or chest tightness requiring nitroglycerin. He has been taking his nebulizer treatment for asthma and he is feeling much better. Denies any nausea, vomiting, abdominal pain, fever, chills, sputum production. No hematuria or other bleeding complaints    Past Medical History:   Diagnosis Date    Asbestosis(501)     CAD (coronary artery disease)     (06/2014) Mid LAD (3.25 X 23 mm ) Xience ROSA M    CPAP (continuous positive airway pressure) dependence     Diabetes (HCC)     Diverticulitis     GERD (gastroesophageal reflux disease)     Hemorrhoids     Hypercholesteremia     Hypertension     Ill-defined condition     constant pain- back ,rt leg    Sleep apnea     On CPAP    Vitamin D deficiency        Past Surgical History:   Procedure Laterality Date    HX APPENDECTOMY      HX ORTHOPAEDIC  2017    HIP REPLCEMENT     ORTHOPAEDIC      [facility] SURG PROCEDURE UNLIST  2014    heart stent    [place] CHEST SURGERY PROCEDURE UNLISTED  1971    lobectomy left lung       Current Outpatient Medications   Medication Sig    nitroglycerin (NITROSTAT) 0.4 mg SL tablet TAKE 1 TABLET BY MOUTH SUBLINGUAL ROUTE AS NEEDED FOR CHEST PAIN    isosorbide mononitrate ER (IMDUR) 30 mg tablet Take 30 mg by mouth daily. atorvastatin (LIPITOR) 40 mg tablet Take 1 Tab by mouth daily. carvedilol (COREG) 3.125 mg tablet TAKE 1 TABLET TWICE A DAY WITH MEALS    aspirin 81 mg tablet Take 162 mg by mouth daily.  PT STATES INSTRUCTED BY DR. Ray Martinez TO HOLD 10 DAYS BEFORE SURGERY albuterol (PROVENTIL HFA, VENTOLIN HFA, PROAIR HFA) 90 mcg/actuation inhaler Take  by inhalation every four to six (4-6) hours as needed. Breztri Aerosphere 160-9-4.8 mcg/actuation HFAA daily. metFORMIN (GLUMETZA) 1,000 mg TG24 24 hour tablet Take  by mouth daily. glimepiride (AMARYL) 4 mg tablet Take  by mouth every evening. ibuprofen 100 mg tablet Take 200 mg by mouth every four (4) hours as needed for Pain. Calcium-Cholecalciferol, D3, (Calcium 600 with Vitamin D3) 600 mg(1,500mg) -400 unit chew Take  by mouth daily. glucosam/chond/hyalu/CF borate (MOVE FREE JOINT HEALTH PO) Take 1 Tab by mouth daily. gabapentin (NEURONTIN) 300 mg capsule Take 300 mg by mouth three (3) times daily. metFORMIN (GLUCOPHAGE) 500 mg tablet every evening.    ergocalciferol (ERGOCALCIFEROL) 50,000 unit capsule Take 50,000 Units by mouth every thirty (30) days. Twice a month    omega-3 fatty acids-vitamin e (FISH OIL) 1,000 mg cap Take 1 Cap by mouth daily. multivitamin (ONE A DAY) tablet Take 1 Tab by mouth daily. No current facility-administered medications for this visit. Allergies and Sensitivities:  No Known Allergies    Family History:  Family History   Problem Relation Age of Onset    Heart Attack Father        Social History:  Social History     Tobacco Use    Smoking status: Former     Packs/day: 1.00     Years: 20.00     Pack years: 20.00     Types: Cigarettes    Smokeless tobacco: Never    Tobacco comments:     QUIT 1997; SMOKED X 20 YRS OFF AND ON   Substance Use Topics    Alcohol use: Not Currently    Drug use: No     He  reports that he has quit smoking. His smoking use included cigarettes. He has a 20.00 pack-year smoking history. He has never used smokeless tobacco.  He  reports that he does not currently use alcohol. Review of Systems:  Cardiac symptoms as noted above in HPI. All others negative.   Denies blurring vision, photophobia, neck pain, hemoptysis, chronic cough, nausea, vomiting, burning micturition, BRBPR, chronic headaches. Physical Exam:  BP Readings from Last 3 Encounters:   07/07/22 122/64   03/31/22 126/62   07/20/21 108/61         Pulse Readings from Last 3 Encounters:   07/07/22 71   03/31/22 68   07/20/21 68          Wt Readings from Last 3 Encounters:   07/07/22 89.4 kg (197 lb)   03/31/22 89.4 kg (197 lb)   07/20/21 89.4 kg (197 lb)       Constitutional: Oriented to person, place, and time. HENT: Head: Normocephalic and atraumatic. Neck: No JVD present. Cardiovascular: Regular rhythm. No murmur, gallop or rubs appreciated  Lung: Breath sounds normal. No respiratory distress. No ronchi or rales appreciated  Abdominal: No tenderness. No rebound and no guarding. Musculoskeletal: There is trace lower extremity edema. Review of Data  LABS:   Lab Results   Component Value Date/Time    Sodium 138 12/03/2020 11:05 AM    Potassium 4.0 12/03/2020 11:05 AM    Chloride 103 12/03/2020 11:05 AM    CO2 31 12/03/2020 11:05 AM    Glucose 100 12/03/2020 11:05 AM    BUN 17 12/03/2020 11:05 AM    Creatinine 0.8 12/03/2020 11:05 AM     No flowsheet data found. Lab Results   Component Value Date/Time    ALT (SGPT) 25 08/04/2020 04:50 PM     No results found for: HBA1C, [id number]    EKG  (07/2014) Sinus rhythm at 73 bpm. No ST-t changes of ischemia. Normal VA and QRS. Normal axis    ECHO (04/2021)  Left Ventricle Normal cavity size, wall thickness and systolic function (ejection fraction normal). The estimated EF is 55 - 60%. Visually measured ejection fraction. There is age-appropriate left ventricular diastolic function. Wall Scoring The left ventricular wall motion is normal.            Left Atrium Normal cavity size. Left Atrium volume index is 26.32 mL/m2. Right Ventricle Normal cavity size and global systolic function. Right Atrium Normal cavity size. Aortic Valve Trileaflet valve structure and no stenosis. Trace aortic valve regurgitation.    Mitral Valve No stenosis and no regurgitation. Mitral valve non-specific thickening. Tricuspid Valve Normal valve structure and no stenosis. Tricuspid regurgitation is inadequate for estimation of right ventricular systolic pressure. Pulmonic Valve Normal valve structure and no stenosis. Trace regurgitation. Aorta Normal aortic root and ascending aorta. Pulmonary Artery Pulmonary hypertension not suggested by Doppler findings. IVC/Hepatic Veins Normal central venous pressure (3 mmHg); IVC diameter is less than 21 mm and collapses more than 50% with respiration. CATHETERIZATION (06/2014)  LVGram:(manual injection)   EF: 55% without significant wall motion abnormalities   Mitral Regurgitation: No significant   No significant gradient across the aortic valve   LVEDP ~12-14 mm hg   Coronary angiography:   Dominance: Right   LM: Angiographically normal   LAD: Mid tubular 95% stenosis otherwise normal, Large D1: prox 20% luminal irregularities otherwise normal   LCX: /OM: Angiographically normal   RCA: Dominant, 10-20% prox luminal irregularities otherwise Angiographically normal   PCI Detail for Mid LAD stenosis   Pre-dilation 2.5 X 15 mm TREK , 3.25 X 23 mm Xience ROSA M , Post-dilation 3.5 X 15 mm TREK NC baloon @ 14-16 génesis   Stenosis from 95% --> 0 %     STRESS TEST(09/16)  1. Pharmacologic nuclear stress test using Lexiscan. 2. No EKG evidence of ischemia during Lexiscan infusion. 3. No convincing evidence of reversible defect to suggest ongoing major ischemia. 4. Diaphragmatic attenuation artifact noted over inferior border of the heart. Very small fixed apical defect, which is most likely from apical thinning versus artifact. 5. Calculated ejection fraction of 71% without any obvious wall motion abnormality. End-diastolic volume of 66 mL. IMPRESSION & PLAN:  Doris Jackson is a [de-identified] y.o. male with coronary artery disease, hypertension, hyperlipidemia and obesity and sleep apnea.      Coronary artery disease:  He had an LAD stent in 2014. Nuclear stress and echo in 09/16 without any RWMA and ischemia. Echo in 04/2021 with normal EF, low risk study  NST in 10/22: Normal stress and rest myocardial perfusion study without evidence of inducible ischemia or scar. Continue BB, imdur, ASA and statin  Continue medical management. Denies any use of nitroglycerin since last time. Hypertension: /64. Currently on Imdur, Coreg. We will discontinue carvedilol and start patient on Toprol, cardioselective with history of reactive airway disease and asthma. Hyperlipidemia:  He is on atorvastatin 40 mg daily. Continue same    This plan was discussed with patient who is in agreement. Thank you for allowing me to participate in patient care. Please feel free to call me if you have any question or concern.

## 2022-11-18 DIAGNOSIS — I25.10 CORONARY ARTERY DISEASE WITHOUT ANGINA PECTORIS, UNSPECIFIED VESSEL OR LESION TYPE, UNSPECIFIED WHETHER NATIVE OR TRANSPLANTED HEART: ICD-10-CM
